# Patient Record
Sex: FEMALE | Race: OTHER | Employment: UNEMPLOYED | URBAN - METROPOLITAN AREA
[De-identification: names, ages, dates, MRNs, and addresses within clinical notes are randomized per-mention and may not be internally consistent; named-entity substitution may affect disease eponyms.]

---

## 2017-04-19 LAB
AMB EXT GLUCOSE 1HR OB: 135
AMB EXT HEMATOCRIT: 29
AMB EXT HEMOGLOBIN: 9.7
AMB EXT RPR: NON REACTIVE

## 2017-06-05 ENCOUNTER — OFFICE VISIT (OUTPATIENT)
Dept: OBGYN CLINIC | Facility: CLINIC | Age: 27
End: 2017-06-05

## 2017-06-05 VITALS
DIASTOLIC BLOOD PRESSURE: 54 MMHG | WEIGHT: 166 LBS | BODY MASS INDEX: 32.59 KG/M2 | HEIGHT: 60 IN | SYSTOLIC BLOOD PRESSURE: 120 MMHG

## 2017-06-05 DIAGNOSIS — Z34.83 PRENATAL CARE, SUBSEQUENT PREGNANCY, THIRD TRIMESTER: Primary | ICD-10-CM

## 2017-06-05 PROBLEM — K80.20 GALL BLADDER STONES: Status: ACTIVE | Noted: 2017-06-05

## 2017-06-05 PROCEDURE — 0500F INITIAL PRENATAL CARE VISIT: CPT | Performed by: OBSTETRICS & GYNECOLOGY

## 2017-06-05 PROCEDURE — 87081 CULTURE SCREEN ONLY: CPT | Performed by: OBSTETRICS & GYNECOLOGY

## 2017-06-05 RX ORDER — MULTIVITAMIN WITH IRON
TABLET ORAL
COMMUNITY

## 2017-06-05 NOTE — PROGRESS NOTES
Patient is transferring care was in [place]. No medicines other than prenatal vitamin she does not smoke no alcohol no drugs Jackie is vaginal delivery of a female. Has gallstones which she needs surgery.   Also history of anal intraepitheleal neoplasi

## 2017-06-12 ENCOUNTER — OFFICE VISIT (OUTPATIENT)
Dept: OBGYN CLINIC | Facility: CLINIC | Age: 27
End: 2017-06-12

## 2017-06-12 VITALS
HEIGHT: 60 IN | WEIGHT: 168 LBS | BODY MASS INDEX: 32.98 KG/M2 | SYSTOLIC BLOOD PRESSURE: 106 MMHG | DIASTOLIC BLOOD PRESSURE: 58 MMHG

## 2017-06-12 DIAGNOSIS — Z3A.39 39 WEEKS GESTATION OF PREGNANCY: ICD-10-CM

## 2017-06-12 DIAGNOSIS — Z34.83 PRENATAL CARE, SUBSEQUENT PREGNANCY, THIRD TRIMESTER: Primary | ICD-10-CM

## 2017-06-12 PROCEDURE — 0502F SUBSEQUENT PRENATAL CARE: CPT | Performed by: OBSTETRICS & GYNECOLOGY

## 2017-06-17 ENCOUNTER — HOSPITAL ENCOUNTER (OUTPATIENT)
Facility: HOSPITAL | Age: 27
Setting detail: OBSERVATION
Discharge: HOME | End: 2017-06-18
Attending: OBSTETRICS & GYNECOLOGY | Admitting: OBSTETRICS & GYNECOLOGY
Payer: MEDICAID

## 2017-06-17 ENCOUNTER — APPOINTMENT (OUTPATIENT)
Dept: ULTRASOUND IMAGING | Facility: HOSPITAL | Age: 27
End: 2017-06-17
Attending: OBSTETRICS & GYNECOLOGY
Payer: MEDICAID

## 2017-06-17 VITALS
TEMPERATURE: 98 F | WEIGHT: 164 LBS | BODY MASS INDEX: 32.2 KG/M2 | HEART RATE: 88 BPM | DIASTOLIC BLOOD PRESSURE: 63 MMHG | RESPIRATION RATE: 18 BRPM | SYSTOLIC BLOOD PRESSURE: 119 MMHG | HEIGHT: 60 IN

## 2017-06-17 PROBLEM — Z34.90 PREGNANCY (HCC): Status: ACTIVE | Noted: 2017-06-17

## 2017-06-17 PROBLEM — Z34.90 PREGNANCY: Status: ACTIVE | Noted: 2017-06-17

## 2017-06-17 PROCEDURE — 76819 FETAL BIOPHYS PROFIL W/O NST: CPT | Performed by: OBSTETRICS & GYNECOLOGY

## 2017-06-17 PROCEDURE — 76811 OB US DETAILED SNGL FETUS: CPT | Performed by: OBSTETRICS & GYNECOLOGY

## 2017-06-18 ENCOUNTER — HOSPITAL ENCOUNTER (INPATIENT)
Facility: HOSPITAL | Age: 27
LOS: 2 days | Discharge: HOME | End: 2017-06-20
Attending: OBSTETRICS & GYNECOLOGY | Admitting: OBSTETRICS & GYNECOLOGY
Payer: MEDICAID

## 2017-06-18 PROCEDURE — 59409 OBSTETRICAL CARE: CPT | Performed by: OBSTETRICS & GYNECOLOGY

## 2017-06-18 PROCEDURE — 0HQ9XZZ REPAIR PERINEUM SKIN, EXTERNAL APPROACH: ICD-10-PCS | Performed by: OBSTETRICS & GYNECOLOGY

## 2017-06-18 PROCEDURE — 59025 FETAL NON-STRESS TEST: CPT | Performed by: OBSTETRICS & GYNECOLOGY

## 2017-06-18 RX ORDER — OXYTOCIN 10 [USP'U]/ML
10 INJECTION, SOLUTION INTRAMUSCULAR; INTRAVENOUS ONCE
Status: COMPLETED | OUTPATIENT
Start: 2017-06-18 | End: 2017-06-18

## 2017-06-18 RX ORDER — CARBOPROST TROMETHAMINE 250 UG/ML
250 INJECTION, SOLUTION INTRAMUSCULAR ONCE
Status: COMPLETED | OUTPATIENT
Start: 2017-06-18 | End: 2017-06-18

## 2017-06-18 RX ORDER — METHYLERGONOVINE MALEATE 0.2 MG/ML
INJECTION INTRAVENOUS
Status: DISPENSED
Start: 2017-06-18 | End: 2017-06-18

## 2017-06-18 RX ORDER — BISACODYL 10 MG
10 SUPPOSITORY, RECTAL RECTAL ONCE AS NEEDED
Status: ACTIVE | OUTPATIENT
Start: 2017-06-18 | End: 2017-06-18

## 2017-06-18 RX ORDER — CARBOPROST TROMETHAMINE 250 UG/ML
INJECTION, SOLUTION INTRAMUSCULAR
Status: DISPENSED
Start: 2017-06-18 | End: 2017-06-18

## 2017-06-18 RX ORDER — MISOPROSTOL 200 UG/1
TABLET ORAL
Status: DISPENSED
Start: 2017-06-18 | End: 2017-06-18

## 2017-06-18 RX ORDER — ONDANSETRON 2 MG/ML
4 INJECTION INTRAMUSCULAR; INTRAVENOUS EVERY 6 HOURS PRN
Status: DISCONTINUED | OUTPATIENT
Start: 2017-06-18 | End: 2017-06-18

## 2017-06-18 RX ORDER — TERBUTALINE SULFATE 1 MG/ML
0.25 INJECTION, SOLUTION SUBCUTANEOUS AS NEEDED
Status: DISCONTINUED | OUTPATIENT
Start: 2017-06-18 | End: 2017-06-18 | Stop reason: HOSPADM

## 2017-06-18 RX ORDER — METHYLERGONOVINE MALEATE 0.2 MG/ML
INJECTION INTRAVENOUS
Status: COMPLETED
Start: 2017-06-18 | End: 2017-06-18

## 2017-06-18 RX ORDER — IBUPROFEN 600 MG/1
600 TABLET ORAL EVERY 6 HOURS
Status: DISCONTINUED | OUTPATIENT
Start: 2017-06-18 | End: 2017-06-20

## 2017-06-18 RX ORDER — ACETAMINOPHEN 325 MG/1
650 TABLET ORAL EVERY 4 HOURS PRN
Status: DISCONTINUED | OUTPATIENT
Start: 2017-06-18 | End: 2017-06-20

## 2017-06-18 RX ORDER — EPHEDRINE SULFATE 50 MG/ML
5 INJECTION, SOLUTION INTRAVENOUS AS NEEDED
Status: DISCONTINUED | OUTPATIENT
Start: 2017-06-18 | End: 2017-06-20

## 2017-06-18 RX ORDER — NALBUPHINE HCL 10 MG/ML
2.5 AMPUL (ML) INJECTION
Status: DISCONTINUED | OUTPATIENT
Start: 2017-06-18 | End: 2017-06-20

## 2017-06-18 RX ORDER — HYDROCODONE BITARTRATE AND ACETAMINOPHEN 5; 325 MG/1; MG/1
2 TABLET ORAL EVERY 4 HOURS PRN
Status: DISCONTINUED | OUTPATIENT
Start: 2017-06-18 | End: 2017-06-20

## 2017-06-18 RX ORDER — DOCUSATE SODIUM 100 MG/1
100 CAPSULE, LIQUID FILLED ORAL
Status: DISCONTINUED | OUTPATIENT
Start: 2017-06-18 | End: 2017-06-20

## 2017-06-18 RX ORDER — IBUPROFEN 600 MG/1
600 TABLET ORAL ONCE AS NEEDED
Status: DISCONTINUED | OUTPATIENT
Start: 2017-06-18 | End: 2017-06-18

## 2017-06-18 RX ORDER — DEXTROSE, SODIUM CHLORIDE, SODIUM LACTATE, POTASSIUM CHLORIDE, AND CALCIUM CHLORIDE 5; .6; .31; .03; .02 G/100ML; G/100ML; G/100ML; G/100ML; G/100ML
INJECTION, SOLUTION INTRAVENOUS AS NEEDED
Status: DISCONTINUED | OUTPATIENT
Start: 2017-06-18 | End: 2017-06-18 | Stop reason: HOSPADM

## 2017-06-18 RX ORDER — ZOLPIDEM TARTRATE 5 MG/1
5 TABLET ORAL NIGHTLY PRN
Status: DISCONTINUED | OUTPATIENT
Start: 2017-06-18 | End: 2017-06-20

## 2017-06-18 RX ORDER — SODIUM CHLORIDE, SODIUM LACTATE, POTASSIUM CHLORIDE, CALCIUM CHLORIDE 600; 310; 30; 20 MG/100ML; MG/100ML; MG/100ML; MG/100ML
INJECTION, SOLUTION INTRAVENOUS CONTINUOUS
Status: DISCONTINUED | OUTPATIENT
Start: 2017-06-18 | End: 2017-06-18 | Stop reason: HOSPADM

## 2017-06-18 RX ORDER — SIMETHICONE 80 MG
80 TABLET,CHEWABLE ORAL 3 TIMES DAILY PRN
Status: DISCONTINUED | OUTPATIENT
Start: 2017-06-18 | End: 2017-06-20

## 2017-06-18 RX ORDER — OXYTOCIN 10 [USP'U]/ML
INJECTION, SOLUTION INTRAMUSCULAR; INTRAVENOUS
Status: DISPENSED
Start: 2017-06-18 | End: 2017-06-18

## 2017-06-18 RX ORDER — HYDROCODONE BITARTRATE AND ACETAMINOPHEN 5; 325 MG/1; MG/1
1 TABLET ORAL EVERY 4 HOURS PRN
Status: DISCONTINUED | OUTPATIENT
Start: 2017-06-18 | End: 2017-06-20

## 2017-06-18 RX ORDER — METHYLERGONOVINE MALEATE 0.2 MG/ML
0.2 INJECTION INTRAVENOUS ONCE
Status: COMPLETED | OUTPATIENT
Start: 2017-06-18 | End: 2017-06-18

## 2017-06-18 NOTE — PROGRESS NOTES
NURSING ADMISSION NOTE      Patient admitted via wheelchair. Oriented to room. Safety precautions initiated. Bed in low position. Call light in reach. Teaching initiated, care plan discussed.

## 2017-06-18 NOTE — PROGRESS NOTES
Patient and  oriented to labor room. Plan of care discussed and questions answered. Consents explained and signed.

## 2017-06-18 NOTE — H&P
[address] Patient Status:  Inpatient    8/10/1990 MRN [id number]   [facility] 1NW-A Attending Arlene Chavez MD   Hosp Day # 0 PCP None Pcp     Subjective:   contractions 3-5 mins 40  weks

## 2017-06-18 NOTE — NST
Nonstress Test   Patient: Marky Cooler    Gestation: 40w3d    NST:       Variability: Moderate           Accelerations: Yes           Decelerations: None            Baseline: 140 BPM           Uterine Irritability: No           Contractions: Irregular

## 2017-06-18 NOTE — PROGRESS NOTES
admitted to triage 5 c/o onset of labor at 0300. EDC 6/15/17 (40 & 3/7 weeks gestation). GBS negative. Denies complications with pregnancy. Denies pertinent medical history. Rates pain 10/10. Declines labor pain relief.   Plan of care discussed a

## 2017-06-18 NOTE — L&D DELIVERY NOTE
Vaginal Delivery Note          Estrellita Kayser Patient Status:  Inpatient    8/10/1990 MRN [id number]   [facility] 1NW-A Attending Maeve Fair MD   Hosp Day # 0 PCP None Pcp

## 2017-06-18 NOTE — PROGRESS NOTES
Discharge instructions given and explained, pt verbalizes understanding and agrees. Pt ambulatory and discharged home accompanied by .

## 2017-06-18 NOTE — PROGRESS NOTES
Pt presents as  w/ edc of 6/15/17 c/o decreased fetal movement all day and irregular ctx. Pt ambulatory and admitted to triage room 4 accompanied by . EFM tested and applied, FHTs tracing and audible in 170s.  Visible fetal movement noted when pl

## 2017-06-18 NOTE — PROGRESS NOTES
Report given to mother baby RN. Pt and infant transferred to mother baby in stable condition accompanied by RN and .

## 2017-06-19 PROCEDURE — 30233N1 TRANSFUSION OF NONAUTOLOGOUS RED BLOOD CELLS INTO PERIPHERAL VEIN, PERCUTANEOUS APPROACH: ICD-10-PCS | Performed by: OBSTETRICS & GYNECOLOGY

## 2017-06-19 RX ORDER — MELATONIN
325
Status: DISCONTINUED | OUTPATIENT
Start: 2017-06-19 | End: 2017-06-20

## 2017-06-19 RX ORDER — SODIUM CHLORIDE 9 MG/ML
INJECTION, SOLUTION INTRAVENOUS ONCE
Status: COMPLETED | OUTPATIENT
Start: 2017-06-19 | End: 2017-06-19

## 2017-06-19 NOTE — PROGRESS NOTES
PPD#1    Pt has no complaints, lochia min   06/18/17 2026   BP: 110/56   Pulse: 80   Temp: 98.4 °F (36.9 °C)   Resp: 18     Recent Labs   Lab  06/18/17 0619 06/19/17 0611   RBC  4.03  2.40*   HGB  10.8*  6.4*   HCT  32.8*  20.4*   MCV  81.4  85.0   M

## 2017-06-19 NOTE — PROGRESS NOTES
Received call back from Dr Romulo Salinas, re: critical hgb. Order noted and received for 2 units PRBCs and PO iron TID.

## 2017-06-19 NOTE — PROGRESS NOTES
06/19/17 0705   Provider Notification   Reason for Communication Critical value  (Hgb 6.4, down from 10.8)   Test/Procedure Name Hgb   Provider Name Dr Porter Reach of Communication Page  (Message left through answering service)   Response Waiting for

## 2017-06-19 NOTE — PROGRESS NOTES
Started first bag PRBC blood transfusion @ 0916. Blood warmer applied. Blood transfusion complete @ 1010. 356 gm PRBC transfused. No adverse reaction. 2nd bag PRBC started @ 8287 and completed 1353. 358 g PRBC transfused. Vital signs done as order.  No adve

## 2017-06-19 NOTE — CM/SW NOTE
CM met with patient and reviewed insurance and PCP for infant. Patient stated that Adi Jackson had just called and they will add infant to [facility] plan for patient. Patient also confirmed that Dr Natividad Loza will be the PCP for patient.  CM asked if dewey

## 2017-06-20 VITALS
SYSTOLIC BLOOD PRESSURE: 101 MMHG | HEIGHT: 60 IN | TEMPERATURE: 98 F | DIASTOLIC BLOOD PRESSURE: 53 MMHG | HEART RATE: 61 BPM | BODY MASS INDEX: 32.2 KG/M2 | WEIGHT: 164 LBS | RESPIRATION RATE: 18 BRPM

## 2017-06-20 PROCEDURE — 99253 IP/OBS CNSLTJ NEW/EST LOW 45: CPT | Performed by: INTERNAL MEDICINE

## 2017-06-20 RX ORDER — MELATONIN
325
Status: DISCONTINUED | OUTPATIENT
Start: 2017-06-20 | End: 2017-06-20

## 2017-06-20 NOTE — CONSULTS
[facility]  Report of Consultation    Krysta Wil Patient Status:  Inpatient    8/10/1990 MRN [id number]   [facility] 1SW-J Attending Daxa Resendez MD   Hosp Day # 2 PCP None Pcp     Reason for Consultation:    Postpartum anemia. systems was completed. Pertinent positives and negatives noted in the the HPI. Physical Exam:  Vital signs: Blood pressure 101/53, pulse 61, temperature 98.1 °F (36.7 °C), temperature source Oral, resp.  rate 18, height 1.524 m (5'), weight 74.39 kg (16

## 2017-06-20 NOTE — PROGRESS NOTES
Dr. Rucker Resides here and discharged patient. Before discharge Hematology consult ordered. Office called and message left. Amee Morrissey

## 2017-06-20 NOTE — PLAN OF CARE
POSTPARTUM    • Long Term Goal:Experiences normal postpartum course Completed    • Optimize infant feeding at the breast Completed    • Establishment of adequate milk supply with medication/procedure interruptions Completed

## 2017-06-20 NOTE — PROGRESS NOTES
Patients labs were  drawn and Dr. Elle Aguirre was hr to speak with  patient. Patient ok to go home and will follow up with OP lab in 1 week. Dr Michelle Dhillon  informd of results and aware of 1 week lab draws.

## 2017-06-20 NOTE — PROGRESS NOTES
[facility]  Post-Partum Progress Note    Jesse Gtz Patient Status:  Inpatient    8/10/1990 MRN [id number]   [facility] 1SW-J Attending Servando Jeffers MD   Hosp Day # 2 PCP None Pcp     SUBJECTIVE:    Postpartum Day 1:    The pat

## 2017-06-20 NOTE — PROGRESS NOTES
[facility]  Post-Partum Progress Note    Kimi Semaj Patient Status:  Inpatient    8/10/1990 MRN [id number]   [facility] 1SW-J Attending Keysha Murillo MD   Hosp Day # 2 PCP None Pcp     SUBJECTIVE:    Postpartum Day 1:    The pat

## 2017-06-20 NOTE — PROGRESS NOTES
Patient discharged home in apparent good condition, with baby in carseat. Discharge instructions completed. Will have lab draw in one wek out patient. ..

## 2017-06-20 NOTE — PLAN OF CARE
POSTPARTUM    • Long Term Goal:Experiences normal postpartum course Progressing    • Optimize infant feeding at the breast Progressing    • Establishment of adequate milk supply with medication/procedure interruptions Progressing

## 2017-06-24 ENCOUNTER — TELEPHONE (OUTPATIENT)
Dept: OBGYN UNIT | Facility: HOSPITAL | Age: 27
End: 2017-06-24

## 2017-06-27 ENCOUNTER — TELEPHONE (OUTPATIENT)
Dept: HEMATOLOGY/ONCOLOGY | Facility: HOSPITAL | Age: 27
End: 2017-06-27

## 2017-06-27 NOTE — TELEPHONE ENCOUNTER
Message   Received:  Today   Message Contents   Cheri Arambula MD  P [facility] R[facility]             Please call, needs repeat labs this week as advised prior to d/c      Left VM for patient with instructions and number to call and schedule lab appointmen

## 2017-07-01 ENCOUNTER — LAB ENCOUNTER (OUTPATIENT)
Dept: LAB | Facility: HOSPITAL | Age: 27
End: 2017-07-01
Attending: OBSTETRICS & GYNECOLOGY
Payer: MEDICAID

## 2017-07-01 DIAGNOSIS — R58 BLEEDING: ICD-10-CM

## 2017-07-01 LAB
APTT PPP: 32.3 SECONDS (ref 25–34)
BASOPHILS # BLD AUTO: 0.06 X10(3) UL (ref 0–0.1)
BASOPHILS NFR BLD AUTO: 0.6 %
DEPRECATED HBV CORE AB SER IA-ACNC: 16.8 NG/ML (ref 10–291)
EOSINOPHIL # BLD AUTO: 0.15 X10(3) UL (ref 0–0.3)
EOSINOPHIL NFR BLD AUTO: 1.4 %
ERYTHROCYTE [DISTWIDTH] IN BLOOD BY AUTOMATED COUNT: 14.4 % (ref 11.5–16)
HCT VFR BLD AUTO: 36.1 % (ref 34–50)
HGB BLD-MCNC: 11.4 G/DL (ref 12–16)
IMMATURE GRANULOCYTE COUNT: 0.06 X10(3) UL (ref 0–1)
IMMATURE GRANULOCYTE RATIO %: 0.6 %
INR BLD: 1.01 (ref 0.89–1.11)
IRON SATURATION: 7 % (ref 13–45)
IRON: 27 UG/DL (ref 28–170)
LYMPHOCYTES # BLD AUTO: 2.01 X10(3) UL (ref 0.9–4)
LYMPHOCYTES NFR BLD AUTO: 19 %
MCH RBC QN AUTO: 27 PG (ref 27–33.2)
MCHC RBC AUTO-ENTMCNC: 31.6 G/DL (ref 31–37)
MCV RBC AUTO: 85.5 FL (ref 81–100)
MONOCYTES # BLD AUTO: 0.4 X10(3) UL (ref 0.1–0.6)
MONOCYTES NFR BLD AUTO: 3.8 %
NEUTROPHIL ABS PRELIM: 7.89 X10 (3) UL (ref 1.3–6.7)
NEUTROPHILS # BLD AUTO: 7.89 X10(3) UL (ref 1.3–6.7)
NEUTROPHILS NFR BLD AUTO: 74.6 %
PLATELET # BLD AUTO: 406 10(3)UL (ref 150–450)
PSA SERPL DL<=0.01 NG/ML-MCNC: 13.3 SECONDS (ref 12–14.3)
RBC # BLD AUTO: 4.22 X10(6)UL (ref 3.8–5.1)
RED CELL DISTRIBUTION WIDTH-SD: 44.4 FL (ref 35.1–46.3)
TOTAL IRON BINDING CAPACITY: 401 UG/DL (ref 298–536)
TRANSFERRIN: 269 MG/DL (ref 200–360)
WBC # BLD AUTO: 10.6 X10(3) UL (ref 4–13)

## 2017-07-01 PROCEDURE — 85025 COMPLETE CBC W/AUTO DIFF WBC: CPT

## 2017-07-01 PROCEDURE — 85610 PROTHROMBIN TIME: CPT

## 2017-07-01 PROCEDURE — 83540 ASSAY OF IRON: CPT

## 2017-07-01 PROCEDURE — 85730 THROMBOPLASTIN TIME PARTIAL: CPT

## 2017-07-01 PROCEDURE — 36415 COLL VENOUS BLD VENIPUNCTURE: CPT

## 2017-07-01 PROCEDURE — 82728 ASSAY OF FERRITIN: CPT

## 2017-07-01 PROCEDURE — 83550 IRON BINDING TEST: CPT

## 2017-07-05 ENCOUNTER — TELEPHONE (OUTPATIENT)
Dept: HEMATOLOGY/ONCOLOGY | Facility: HOSPITAL | Age: 27
End: 2017-07-05

## 2017-07-05 NOTE — TELEPHONE ENCOUNTER
Tammy Lindsey MD  P [facility] Rns             Call, Hb much better but Fe still low. Is she taking oral iron? Should continue till she is breastfeeding or for 6 months from now, whichever is longer      Spoke to patient, verbalized understanding.

## 2017-07-07 ENCOUNTER — MED REC SCAN ONLY (OUTPATIENT)
Dept: OBGYN CLINIC | Facility: CLINIC | Age: 27
End: 2017-07-07

## 2017-08-09 ENCOUNTER — OFFICE VISIT (OUTPATIENT)
Dept: OBGYN CLINIC | Facility: CLINIC | Age: 27
End: 2017-08-09

## 2017-08-09 VITALS
DIASTOLIC BLOOD PRESSURE: 60 MMHG | HEIGHT: 60 IN | HEART RATE: 88 BPM | WEIGHT: 141 LBS | BODY MASS INDEX: 27.68 KG/M2 | SYSTOLIC BLOOD PRESSURE: 118 MMHG

## 2017-08-09 NOTE — PROGRESS NOTES
HPI    Joseph Escoto is a 27year old female  here for 6 week post-partum visit. Patient delivered a  female infant on 17 via . Patient desires nexplanon for contraception. Patient is breast feeding.    Patient denies symptoms of depre no abnormal discharge  Cervix:  Normal without tenderness on motion  Uterus: normal in size, contour, position, mobility, without tenderness  Adnexa: normal without masses or tenderness  Perineum: well healed perineum  Anus: no hemorroids       ASSESSMENT/

## 2017-08-15 ENCOUNTER — OFFICE VISIT (OUTPATIENT)
Dept: OBGYN CLINIC | Facility: CLINIC | Age: 27
End: 2017-08-15

## 2017-08-15 VITALS
DIASTOLIC BLOOD PRESSURE: 70 MMHG | HEIGHT: 60 IN | SYSTOLIC BLOOD PRESSURE: 124 MMHG | TEMPERATURE: 99 F | HEART RATE: 88 BPM | RESPIRATION RATE: 18 BRPM | WEIGHT: 142 LBS | BODY MASS INDEX: 27.88 KG/M2

## 2017-08-15 DIAGNOSIS — Z30.017 INSERTION OF IMPLANTABLE SUBDERMAL CONTRACEPTIVE: ICD-10-CM

## 2017-08-15 DIAGNOSIS — Z30.017 NEXPLANON INSERTION: Primary | ICD-10-CM

## 2017-08-15 LAB
CONTROL LINE PRESENT WITH A CLEAR BACKGROUND (YES/NO): YES YES/NO
PREGNANCY TEST, URINE: NEGATIVE

## 2017-08-15 PROCEDURE — 11981 INSERTION DRUG DLVR IMPLANT: CPT | Performed by: OBSTETRICS & GYNECOLOGY

## 2017-08-15 PROCEDURE — 81025 URINE PREGNANCY TEST: CPT | Performed by: OBSTETRICS & GYNECOLOGY

## 2017-08-15 NOTE — PROGRESS NOTES
Nexplanon Insertion    Pregnancy Results: negative from urine test   Birth control method(s) used:    Consent was obtained from the patient. Insertion:  Lot Number   The patient was positioned with her left arm flexed.      2% lidocaine with epinephrine

## 2017-08-22 ENCOUNTER — MED REC SCAN ONLY (OUTPATIENT)
Dept: OBGYN CLINIC | Facility: CLINIC | Age: 27
End: 2017-08-22

## 2017-09-22 ENCOUNTER — TELEPHONE (OUTPATIENT)
Dept: OBGYN CLINIC | Facility: CLINIC | Age: 27
End: 2017-09-22

## 2019-12-20 LAB
AMB EXT HPV: NEGATIVE
AMB EXT THINPREP PAP: NEGATIVE

## 2021-07-12 ENCOUNTER — HOSPITAL ENCOUNTER (EMERGENCY)
Facility: HOSPITAL | Age: 31
Discharge: HOME | End: 2021-07-12
Attending: EMERGENCY MEDICINE
Payer: MEDICAID

## 2021-07-12 ENCOUNTER — APPOINTMENT (OUTPATIENT)
Dept: ULTRASOUND IMAGING | Facility: HOSPITAL | Age: 31
End: 2021-07-12
Attending: EMERGENCY MEDICINE
Payer: MEDICAID

## 2021-07-12 ENCOUNTER — APPOINTMENT (OUTPATIENT)
Dept: CT IMAGING | Facility: HOSPITAL | Age: 31
End: 2021-07-12
Attending: EMERGENCY MEDICINE
Payer: MEDICAID

## 2021-07-12 VITALS
SYSTOLIC BLOOD PRESSURE: 111 MMHG | TEMPERATURE: 98 F | HEART RATE: 71 BPM | OXYGEN SATURATION: 98 % | WEIGHT: 140 LBS | DIASTOLIC BLOOD PRESSURE: 67 MMHG | HEIGHT: 63 IN | BODY MASS INDEX: 24.8 KG/M2 | RESPIRATION RATE: 15 BRPM

## 2021-07-12 DIAGNOSIS — R10.31 RLQ ABDOMINAL PAIN: Primary | ICD-10-CM

## 2021-07-12 LAB
ALBUMIN SERPL-MCNC: 4 G/DL (ref 3.4–5)
ALBUMIN/GLOB SERPL: 1.1 {RATIO} (ref 1–2)
ALP LIVER SERPL-CCNC: 61 U/L
ALT SERPL-CCNC: 18 U/L
ANION GAP SERPL CALC-SCNC: 1 MMOL/L (ref 0–18)
AST SERPL-CCNC: 11 U/L (ref 15–37)
B-HCG SERPL-ACNC: <1 MIU/ML
BASOPHILS # BLD AUTO: 0.04 X10(3) UL (ref 0–0.2)
BASOPHILS NFR BLD AUTO: 0.7 %
BILIRUB SERPL-MCNC: 0.4 MG/DL (ref 0.1–2)
BUN BLD-MCNC: 8 MG/DL (ref 7–18)
BUN/CREAT SERPL: 12.9 (ref 10–20)
CALCIUM BLD-MCNC: 8.6 MG/DL (ref 8.5–10.1)
CHLORIDE SERPL-SCNC: 107 MMOL/L (ref 98–112)
CO2 SERPL-SCNC: 29 MMOL/L (ref 21–32)
CREAT BLD-MCNC: 0.62 MG/DL
DEPRECATED RDW RBC AUTO: 40.6 FL (ref 35.1–46.3)
EOSINOPHIL # BLD AUTO: 0.1 X10(3) UL (ref 0–0.7)
EOSINOPHIL NFR BLD AUTO: 1.8 %
ERYTHROCYTE [DISTWIDTH] IN BLOOD BY AUTOMATED COUNT: 12.7 % (ref 11–15)
GLOBULIN PLAS-MCNC: 3.7 G/DL (ref 2.8–4.4)
GLUCOSE BLD-MCNC: 85 MG/DL (ref 70–99)
HCT VFR BLD AUTO: 40.5 %
HGB BLD-MCNC: 13.4 G/DL
IMM GRANULOCYTES # BLD AUTO: 0.02 X10(3) UL (ref 0–1)
IMM GRANULOCYTES NFR BLD: 0.4 %
LIPASE SERPL-CCNC: 81 U/L (ref 73–393)
LYMPHOCYTES # BLD AUTO: 2.18 X10(3) UL (ref 1–4)
LYMPHOCYTES NFR BLD AUTO: 40.1 %
M PROTEIN MFR SERPL ELPH: 7.7 G/DL (ref 6.4–8.2)
MCH RBC QN AUTO: 28.9 PG (ref 26–34)
MCHC RBC AUTO-ENTMCNC: 33.1 G/DL (ref 31–37)
MCV RBC AUTO: 87.5 FL
MONOCYTES # BLD AUTO: 0.39 X10(3) UL (ref 0.1–1)
MONOCYTES NFR BLD AUTO: 7.2 %
NEUTROPHILS # BLD AUTO: 2.71 X10 (3) UL (ref 1.5–7.7)
NEUTROPHILS # BLD AUTO: 2.71 X10(3) UL (ref 1.5–7.7)
NEUTROPHILS NFR BLD AUTO: 49.8 %
OSMOLALITY SERPL CALC.SUM OF ELEC: 282 MOSM/KG (ref 275–295)
PLATELET # BLD AUTO: 294 10(3)UL (ref 150–450)
POTASSIUM SERPL-SCNC: 3.5 MMOL/L (ref 3.5–5.1)
RBC # BLD AUTO: 4.63 X10(6)UL
SODIUM SERPL-SCNC: 137 MMOL/L (ref 136–145)
WBC # BLD AUTO: 5.4 X10(3) UL (ref 4–11)

## 2021-07-12 PROCEDURE — 83690 ASSAY OF LIPASE: CPT | Performed by: EMERGENCY MEDICINE

## 2021-07-12 PROCEDURE — 76856 US EXAM PELVIC COMPLETE: CPT | Performed by: EMERGENCY MEDICINE

## 2021-07-12 PROCEDURE — 76830 TRANSVAGINAL US NON-OB: CPT | Performed by: EMERGENCY MEDICINE

## 2021-07-12 PROCEDURE — 85025 COMPLETE CBC W/AUTO DIFF WBC: CPT | Performed by: EMERGENCY MEDICINE

## 2021-07-12 PROCEDURE — 84702 CHORIONIC GONADOTROPIN TEST: CPT | Performed by: EMERGENCY MEDICINE

## 2021-07-12 PROCEDURE — 93975 VASCULAR STUDY: CPT | Performed by: EMERGENCY MEDICINE

## 2021-07-12 PROCEDURE — 96361 HYDRATE IV INFUSION ADD-ON: CPT

## 2021-07-12 PROCEDURE — 83690 ASSAY OF LIPASE: CPT

## 2021-07-12 PROCEDURE — 96374 THER/PROPH/DIAG INJ IV PUSH: CPT

## 2021-07-12 PROCEDURE — 80053 COMPREHEN METABOLIC PANEL: CPT | Performed by: EMERGENCY MEDICINE

## 2021-07-12 PROCEDURE — 99285 EMERGENCY DEPT VISIT HI MDM: CPT

## 2021-07-12 PROCEDURE — 74177 CT ABD & PELVIS W/CONTRAST: CPT | Performed by: EMERGENCY MEDICINE

## 2021-07-12 PROCEDURE — 80053 COMPREHEN METABOLIC PANEL: CPT

## 2021-07-12 PROCEDURE — 85025 COMPLETE CBC W/AUTO DIFF WBC: CPT

## 2021-07-12 PROCEDURE — 99284 EMERGENCY DEPT VISIT MOD MDM: CPT

## 2021-07-12 RX ORDER — KETOROLAC TROMETHAMINE 30 MG/ML
30 INJECTION, SOLUTION INTRAMUSCULAR; INTRAVENOUS ONCE
Status: COMPLETED | OUTPATIENT
Start: 2021-07-12 | End: 2021-07-12

## 2021-07-12 NOTE — ED PROVIDER NOTES
17-year-old female who presented with waxing and waning right lower quadrant pain for about 2 weeks. Some irregular spotting as well. Comfortable on exam.  Labs unremarkable. Imaging results as below.     CT APPENDIX ABD/PEL W CONTRAST (CPT=74177)    Res with the clinical symptoms to exclude acute cholecystitis. If further evaluation is needed, consider ultrasound. 3. Fat containing lesion within the right ovary measuring 3.0 x 2.8 x 3.2 cm likely represent a dermoid.  4. Prominent pelvic venous collateral contrast may be done if needed. Dictated by (CST): Pam Abdi MD on 7/12/2021 at 4:34 PM     Finalized by (CST): Pam Adbi MD on 7/12/2021 at 4:37 PM         Etiology of her pain is unclear at this point.   No evidence of torsion or ovar

## 2021-07-12 NOTE — ED INITIAL ASSESSMENT (HPI)
Pt reports to the ER with abdominal pain to the abdomen radiating to her back. Pt reports that the pain originated two weeks prior. Pt reports that she began to experience bleeding starting yesterday morning. Bleeding is described as light.

## 2021-07-12 NOTE — ED PROVIDER NOTES
Patient Seen in: [facility] Emergency Department      History   Patient presents with:  Abdomen/Flank Pain  Eval-G    Stated Complaint: RLQ pain, vaginal bleeding    HPI/Subjective:   HPI    Patient is a 27-year-old previously healthy female present signs reviewed. All other systems reviewed and negative except as noted above.     Physical Exam     ED Triage Vitals   BP 07/12/21 1219 126/73   Pulse 07/12/21 1219 63   Resp 07/12/21 1219 18   Temp 07/12/21 1309 97.7 °F (36.5 °C)   Temp src --    SpO TEST) - Normal   CBC WITH DIFFERENTIAL WITH PLATELET    Narrative: The following orders were created for panel order CBC With Differential With Platelet.   Procedure                               Abnormality         Status                     ---------

## 2021-09-02 ENCOUNTER — HOSPITAL ENCOUNTER (EMERGENCY)
Facility: HOSPITAL | Age: 31
Discharge: HOME | End: 2021-09-02
Attending: EMERGENCY MEDICINE
Payer: MEDICAID

## 2021-09-02 VITALS
RESPIRATION RATE: 16 BRPM | BODY MASS INDEX: 27.48 KG/M2 | HEIGHT: 60 IN | HEART RATE: 102 BPM | WEIGHT: 140 LBS | SYSTOLIC BLOOD PRESSURE: 109 MMHG | OXYGEN SATURATION: 96 % | DIASTOLIC BLOOD PRESSURE: 64 MMHG | TEMPERATURE: 100 F

## 2021-09-02 DIAGNOSIS — K04.7 DENTAL INFECTION: Primary | ICD-10-CM

## 2021-09-02 DIAGNOSIS — E86.0 DEHYDRATION: ICD-10-CM

## 2021-09-02 DIAGNOSIS — R51.9 ACUTE NONINTRACTABLE HEADACHE, UNSPECIFIED HEADACHE TYPE: ICD-10-CM

## 2021-09-02 LAB
ALBUMIN SERPL-MCNC: 3.5 G/DL (ref 3.4–5)
ALBUMIN/GLOB SERPL: 0.8 {RATIO} (ref 1–2)
ALP LIVER SERPL-CCNC: 71 U/L
ALT SERPL-CCNC: 21 U/L
ANION GAP SERPL CALC-SCNC: 3 MMOL/L (ref 0–18)
AST SERPL-CCNC: 18 U/L (ref 15–37)
BASOPHILS # BLD AUTO: 0.04 X10(3) UL (ref 0–0.2)
BASOPHILS NFR BLD AUTO: 0.3 %
BILIRUB SERPL-MCNC: 0.6 MG/DL (ref 0.1–2)
BILIRUB UR QL STRIP.AUTO: NEGATIVE
BUN BLD-MCNC: 8 MG/DL (ref 7–18)
CALCIUM BLD-MCNC: 8.7 MG/DL (ref 8.5–10.1)
CHLORIDE SERPL-SCNC: 104 MMOL/L (ref 98–112)
CO2 SERPL-SCNC: 27 MMOL/L (ref 21–32)
COLOR UR AUTO: YELLOW
CREAT BLD-MCNC: 0.73 MG/DL
EOSINOPHIL # BLD AUTO: 0 X10(3) UL (ref 0–0.7)
EOSINOPHIL NFR BLD AUTO: 0 %
ERYTHROCYTE [DISTWIDTH] IN BLOOD BY AUTOMATED COUNT: 12.2 %
GLOBULIN PLAS-MCNC: 4.6 G/DL (ref 2.8–4.4)
GLUCOSE BLD-MCNC: 138 MG/DL (ref 70–99)
GLUCOSE UR STRIP.AUTO-MCNC: NEGATIVE MG/DL
HCT VFR BLD AUTO: 36.9 %
HGB BLD-MCNC: 12.2 G/DL
IMM GRANULOCYTES # BLD AUTO: 0.07 X10(3) UL (ref 0–1)
IMM GRANULOCYTES NFR BLD: 0.4 %
KETONES UR STRIP.AUTO-MCNC: NEGATIVE MG/DL
LYMPHOCYTES # BLD AUTO: 0.89 X10(3) UL (ref 1–4)
LYMPHOCYTES NFR BLD AUTO: 5.6 %
M PROTEIN MFR SERPL ELPH: 8.1 G/DL (ref 6.4–8.2)
MCH RBC QN AUTO: 28.6 PG (ref 26–34)
MCHC RBC AUTO-ENTMCNC: 33.1 G/DL (ref 31–37)
MCV RBC AUTO: 86.6 FL
MONOCYTES # BLD AUTO: 0.79 X10(3) UL (ref 0.1–1)
MONOCYTES NFR BLD AUTO: 5 %
NEUTROPHILS # BLD AUTO: 14.15 X10 (3) UL (ref 1.5–7.7)
NEUTROPHILS # BLD AUTO: 14.15 X10(3) UL (ref 1.5–7.7)
NEUTROPHILS NFR BLD AUTO: 88.7 %
NITRITE UR QL STRIP.AUTO: NEGATIVE
OSMOLALITY SERPL CALC.SUM OF ELEC: 279 MOSM/KG (ref 275–295)
PH UR STRIP.AUTO: 6 [PH] (ref 5–8)
PLATELET # BLD AUTO: 243 10(3)UL (ref 150–450)
POTASSIUM SERPL-SCNC: 3.4 MMOL/L (ref 3.5–5.1)
PROT UR STRIP.AUTO-MCNC: 30 MG/DL
RBC # BLD AUTO: 4.26 X10(6)UL
SARS-COV-2 RNA RESP QL NAA+PROBE: NOT DETECTED
SODIUM SERPL-SCNC: 134 MMOL/L (ref 136–145)
SP GR UR STRIP.AUTO: 1.01 (ref 1–1.03)
UROBILINOGEN UR STRIP.AUTO-MCNC: <2 MG/DL
WBC # BLD AUTO: 15.9 X10(3) UL (ref 4–11)

## 2021-09-02 PROCEDURE — 87186 SC STD MICRODIL/AGAR DIL: CPT | Performed by: EMERGENCY MEDICINE

## 2021-09-02 PROCEDURE — 96365 THER/PROPH/DIAG IV INF INIT: CPT

## 2021-09-02 PROCEDURE — 87077 CULTURE AEROBIC IDENTIFY: CPT | Performed by: EMERGENCY MEDICINE

## 2021-09-02 PROCEDURE — 87086 URINE CULTURE/COLONY COUNT: CPT | Performed by: EMERGENCY MEDICINE

## 2021-09-02 PROCEDURE — 99284 EMERGENCY DEPT VISIT MOD MDM: CPT

## 2021-09-02 PROCEDURE — 96375 TX/PRO/DX INJ NEW DRUG ADDON: CPT

## 2021-09-02 PROCEDURE — 85025 COMPLETE CBC W/AUTO DIFF WBC: CPT | Performed by: EMERGENCY MEDICINE

## 2021-09-02 PROCEDURE — 96361 HYDRATE IV INFUSION ADD-ON: CPT

## 2021-09-02 PROCEDURE — 80053 COMPREHEN METABOLIC PANEL: CPT | Performed by: EMERGENCY MEDICINE

## 2021-09-02 PROCEDURE — 81001 URINALYSIS AUTO W/SCOPE: CPT | Performed by: EMERGENCY MEDICINE

## 2021-09-02 RX ORDER — KETOROLAC TROMETHAMINE 30 MG/ML
15 INJECTION, SOLUTION INTRAMUSCULAR; INTRAVENOUS ONCE
Status: COMPLETED | OUTPATIENT
Start: 2021-09-02 | End: 2021-09-02

## 2021-09-02 RX ORDER — METOCLOPRAMIDE HYDROCHLORIDE 5 MG/ML
10 INJECTION INTRAMUSCULAR; INTRAVENOUS ONCE
Status: COMPLETED | OUTPATIENT
Start: 2021-09-02 | End: 2021-09-02

## 2021-09-02 RX ORDER — ACETAMINOPHEN AND CODEINE PHOSPHATE 300; 30 MG/1; MG/1
1-2 TABLET ORAL EVERY 6 HOURS PRN
Qty: 10 TABLET | Refills: 0 | Status: SHIPPED | OUTPATIENT
Start: 2021-09-02 | End: 2021-09-09

## 2021-09-02 RX ORDER — AMOXICILLIN AND CLAVULANATE POTASSIUM 875; 125 MG/1; MG/1
1 TABLET, FILM COATED ORAL 2 TIMES DAILY
Qty: 20 TABLET | Refills: 0 | Status: SHIPPED | OUTPATIENT
Start: 2021-09-02 | End: 2021-09-12

## 2021-09-02 RX ORDER — DIPHENHYDRAMINE HYDROCHLORIDE 50 MG/ML
50 INJECTION INTRAMUSCULAR; INTRAVENOUS ONCE
Status: COMPLETED | OUTPATIENT
Start: 2021-09-02 | End: 2021-09-02

## 2021-09-02 NOTE — ED PROVIDER NOTES
Patient Seen in: [facility] Emergency Department      History   Patient presents with:  Headache  Dental Problem    Stated Complaint: headache after pt went to the dentist, two cavities filled nd cleaning, pain si*    HPI/Subjective:   HPI    29-yea (Oral)   Resp 16   Ht 152.4 cm (5')   Wt 63.5 kg   LMP 08/10/2021   SpO2 96%   BMI 27.34 kg/m²         Physical Exam  Vitals and nursing note reviewed. Chaperone present: Sister in room. Constitutional:       General: She is in acute distress.       Appea (*)     Sodium 134 (*)     Potassium 3.4 (*)     Globulin  4.6 (*)     A/G Ratio 0.8 (*)     All other components within normal limits   URINALYSIS WITH CULTURE REFLEX - Abnormal; Notable for the following components:    Clarity Urine Hazy (*)     Blood Ur giving her her fever.     Patient was feeling much better and was discharged in good condition                             Disposition and Plan     Clinical Impression:  Dental infection  (primary encounter diagnosis)  Acute nonintractable headache, unspeci

## 2021-09-02 NOTE — ED INITIAL ASSESSMENT (HPI)
Pt to ED with c/o headache x3 days, mid upper abdominal pain began at 3am, states had dental cleaning on Monday also having jaw pain, nausea and fevers x 2 days.

## 2023-02-23 LAB
AMB EXT ANTIBODY SCREEN: NEGATIVE
AMB EXT GLUCOSE 1HR OB: 126
AMB EXT HBSAG SCREEN: NEGATIVE
AMB EXT HEMATOCRIT: 35.2
AMB EXT HEMOGLOBIN: 12.1
AMB EXT HEPATITIS C VIRUS ANTIBODY: NEGATIVE
AMB EXT HIV AG AB COMBO: NEGATIVE
AMB EXT MCV: 87.2
AMB EXT PLATELETS: 296
AMB EXT RPR: NON REACTIVE

## 2023-04-20 LAB
AMB EXT GLUCOSE 1HR OB: 145
AMB EXT HEMATOCRIT: 33.9
AMB EXT HEMOGLOBIN: 11.6

## 2023-05-16 ENCOUNTER — HOSPITAL ENCOUNTER (OUTPATIENT)
Facility: HOSPITAL | Age: 33
Discharge: HOME | End: 2023-05-16
Attending: OBSTETRICS & GYNECOLOGY | Admitting: OBSTETRICS & GYNECOLOGY
Payer: MEDICAID

## 2023-05-16 VITALS — TEMPERATURE: 98 F | DIASTOLIC BLOOD PRESSURE: 60 MMHG | SYSTOLIC BLOOD PRESSURE: 129 MMHG | HEART RATE: 102 BPM

## 2023-05-16 LAB
ALBUMIN SERPL-MCNC: 2.8 G/DL (ref 3.4–5)
ALBUMIN/GLOB SERPL: 0.7 {RATIO} (ref 1–2)
ALP LIVER SERPL-CCNC: 68 U/L
ALT SERPL-CCNC: 23 U/L
ANION GAP SERPL CALC-SCNC: 5 MMOL/L (ref 0–18)
AST SERPL-CCNC: 21 U/L (ref 15–37)
BASOPHILS # BLD AUTO: 0.02 X10(3) UL (ref 0–0.2)
BASOPHILS NFR BLD AUTO: 0.2 %
BILIRUB SERPL-MCNC: 0.3 MG/DL (ref 0.1–2)
BILIRUBIN URINE: NEGATIVE
BLOOD URINE: NEGATIVE
BUN BLD-MCNC: 6 MG/DL (ref 7–18)
CALCIUM BLD-MCNC: 8.5 MG/DL (ref 8.5–10.1)
CHLORIDE SERPL-SCNC: 110 MMOL/L (ref 98–112)
CO2 SERPL-SCNC: 22 MMOL/L (ref 21–32)
CONTROL RUN WITHIN 24 HOURS?: YES
CREAT BLD-MCNC: 0.3 MG/DL
EOSINOPHIL # BLD AUTO: 0.07 X10(3) UL (ref 0–0.7)
EOSINOPHIL NFR BLD AUTO: 0.8 %
ERYTHROCYTE [DISTWIDTH] IN BLOOD BY AUTOMATED COUNT: 12.5 %
FASTING STATUS PATIENT QL REPORTED: NO
GFR SERPLBLD BASED ON 1.73 SQ M-ARVRAT: 146 ML/MIN/1.73M2 (ref 60–?)
GLOBULIN PLAS-MCNC: 3.8 G/DL (ref 2.8–4.4)
GLUCOSE BLD-MCNC: 89 MG/DL (ref 70–99)
GLUCOSE URINE: NEGATIVE
HCT VFR BLD AUTO: 31.3 %
HGB BLD-MCNC: 10.4 G/DL
IMM GRANULOCYTES # BLD AUTO: 0.04 X10(3) UL (ref 0–1)
IMM GRANULOCYTES NFR BLD: 0.5 %
KETONE URINE: NEGATIVE
LYMPHOCYTES # BLD AUTO: 0.97 X10(3) UL (ref 1–4)
LYMPHOCYTES NFR BLD AUTO: 11.3 %
MCH RBC QN AUTO: 28.7 PG (ref 26–34)
MCHC RBC AUTO-ENTMCNC: 33.2 G/DL (ref 31–37)
MCV RBC AUTO: 86.5 FL
MONOCYTES # BLD AUTO: 0.31 X10(3) UL (ref 0.1–1)
MONOCYTES NFR BLD AUTO: 3.6 %
NEUTROPHILS # BLD AUTO: 7.19 X10 (3) UL (ref 1.5–7.7)
NEUTROPHILS # BLD AUTO: 7.19 X10(3) UL (ref 1.5–7.7)
NEUTROPHILS NFR BLD AUTO: 83.6 %
NITRITE URINE: NEGATIVE
OSMOLALITY SERPL CALC.SUM OF ELEC: 281 MOSM/KG (ref 275–295)
PH URINE: 7 (ref 5–8)
PLATELET # BLD AUTO: 234 10(3)UL (ref 150–450)
POTASSIUM SERPL-SCNC: 3.6 MMOL/L (ref 3.5–5.1)
PROT SERPL-MCNC: 6.6 G/DL (ref 6.4–8.2)
PROTEIN URINE: NEGATIVE
RBC # BLD AUTO: 3.62 X10(6)UL
SODIUM SERPL-SCNC: 137 MMOL/L (ref 136–145)
SPEC GRAVITY: 1.01 (ref 1–1.03)
URINE CLARITY: CLEAR
URINE COLOR: YELLOW
UROBILINOGEN URINE: 0.2
WBC # BLD AUTO: 8.6 X10(3) UL (ref 4–11)

## 2023-05-16 PROCEDURE — 99203 OFFICE O/P NEW LOW 30 MIN: CPT | Performed by: OBSTETRICS & GYNECOLOGY

## 2023-05-16 RX ORDER — LOPERAMIDE HCL 1 MG/7.5ML
2 SOLUTION ORAL ONCE
Status: COMPLETED | OUTPATIENT
Start: 2023-05-16 | End: 2023-05-16

## 2023-05-16 RX ORDER — ONDANSETRON 2 MG/ML
4 INJECTION INTRAMUSCULAR; INTRAVENOUS ONCE
Status: COMPLETED | OUTPATIENT
Start: 2023-05-16 | End: 2023-05-16

## 2023-05-16 NOTE — PROGRESS NOTES
Care relinquished to [facility] [employer] RN. Patient in stable condition. Still awaiting prenatal records from previous OB office.

## 2023-05-16 NOTE — NST
Nonstress Test   Patient: Rory Calvillo    Gestation: 32w0d    NST:       Variability: Moderate           Accelerations: Yes           Decelerations: None            Baseline: 135 BPM           Uterine Irritability: Yes           Contractions: Irregular                                        Acoustic Stimulator: No           Nonstress Test Interpretation: Reactive           Nonstress Test Second Interpretation: Reactive                     Additional Comments

## 2023-05-16 NOTE — PROGRESS NOTES
Pt is a 29year old female admitted to TRG3/TRG3-A. Patient presents with:   Assessment: Nausea and diarrhea since 0600 this am, patient thinking its food poisoning from meat she ate last night. +FM, denies LOF, cramping/contractions       Pt is  32w0d intra-uterine pregnancy. History obtained, consents signed. Oriented to room, staff, and plan of care.

## 2023-05-16 NOTE — DISCHARGE INSTRUCTIONS
Discharge Instructions    Diet: Rebekah Ely diet or BRAT diet, stay hydrated  Activity: Normal activity         General Instructions    Call your OB doctor if: Fluid leaking from your vagina;Uterine contractions 10 minutes or closer for 1 to 2 hours;Uterine contractions increasing in intensity and frequency; Decrease in fetal movement;Vaginal bleeding;Temperature greater than 100F;Vaginal or rectal pressure

## 2023-05-30 ENCOUNTER — APPOINTMENT (OUTPATIENT)
Dept: ULTRASOUND IMAGING | Facility: HOSPITAL | Age: 33
End: 2023-05-30
Attending: OBSTETRICS & GYNECOLOGY
Payer: MEDICAID

## 2023-05-30 ENCOUNTER — HOSPITAL ENCOUNTER (OUTPATIENT)
Facility: HOSPITAL | Age: 33
Discharge: HOME | End: 2023-05-31
Attending: OBSTETRICS & GYNECOLOGY | Admitting: OBSTETRICS & GYNECOLOGY
Payer: MEDICAID

## 2023-05-30 VITALS
HEART RATE: 82 BPM | TEMPERATURE: 97 F | SYSTOLIC BLOOD PRESSURE: 124 MMHG | RESPIRATION RATE: 18 BRPM | DIASTOLIC BLOOD PRESSURE: 59 MMHG

## 2023-05-30 PROCEDURE — 87653 STREP B DNA AMP PROBE: CPT | Performed by: OBSTETRICS & GYNECOLOGY

## 2023-05-30 PROCEDURE — 87081 CULTURE SCREEN ONLY: CPT | Performed by: OBSTETRICS & GYNECOLOGY

## 2023-05-30 PROCEDURE — 76818 FETAL BIOPHYS PROFILE W/NST: CPT | Performed by: OBSTETRICS & GYNECOLOGY

## 2023-05-30 PROCEDURE — 76819 FETAL BIOPHYS PROFIL W/O NST: CPT | Performed by: OBSTETRICS & GYNECOLOGY

## 2023-05-31 ENCOUNTER — TELEPHONE (OUTPATIENT)
Facility: CLINIC | Age: 33
End: 2023-05-31

## 2023-05-31 PROCEDURE — 99213 OFFICE O/P EST LOW 20 MIN: CPT

## 2023-05-31 PROCEDURE — 59025 FETAL NON-STRESS TEST: CPT

## 2023-05-31 NOTE — PROGRESS NOTES
Discharged to home per ambulatory in stable condition with written and verbal instructions. Kick counts reviewed. Patient verbalizes understanding of information given.

## 2023-05-31 NOTE — TELEPHONE ENCOUNTER
Pt filled out CAM to request pregnancy records from [facility]; faxed to [phone], conformation rec'd; CAM sent to scanning.

## 2023-05-31 NOTE — NST
Nonstress Test   Patient: Dex Mcdonald    Gestation: 34w1d    NST:       Variability: Moderate           Accelerations: Yes           Decelerations: None            Baseline: 125 BPM           Uterine Irritability: Yes           Contractions: Irregular                    Contraction Frequency: occas with irrit, pt not aware on ctx                   Acoustic Stimulator: No           Nonstress Test Interpretation: Reactive           Nonstress Test Second Interpretation: Reactive                     Additional Comments

## 2023-05-31 NOTE — PROGRESS NOTES
Pt is a 29year old female admitted to TR5/TRG5-A. Patient presents with:  Decreased Fetal Movement: Pt felt baby move about an hour ago but the movements have been less today, denies lof, ctx, bleeding     Pt is  34w0d intra-uterine pregnancy. History obtained, consents signed. Oriented to room, staff, and plan of care.

## 2023-05-31 NOTE — DISCHARGE INSTRUCTIONS
Labor Discharge Instructions    Call your OB doctor if you have any of the following signs:    Period-like cramps that may come and go  Low, dull backache  Pressure in your lower abdomen that may feel like the baby is pushing down  Change in the type or amount of vaginal discharge  Abdominal cramps that may be accompanied by diarrhea  Fluid leaking from your vagina (may or may not be bloody)

## 2023-06-01 LAB — GROUP B STREP BY PCR FOR PCR OVT: NEGATIVE

## 2023-06-07 ENCOUNTER — INITIAL PRENATAL (OUTPATIENT)
Facility: CLINIC | Age: 33
End: 2023-06-07
Payer: MEDICAID

## 2023-06-07 VITALS
DIASTOLIC BLOOD PRESSURE: 66 MMHG | BODY MASS INDEX: 33.77 KG/M2 | HEART RATE: 108 BPM | HEIGHT: 60 IN | WEIGHT: 172 LBS | SYSTOLIC BLOOD PRESSURE: 112 MMHG

## 2023-06-07 DIAGNOSIS — Z36.89 EVALUATE FETAL POSITION USING ULTRASOUND: ICD-10-CM

## 2023-06-07 DIAGNOSIS — Z11.4 ENCOUNTER FOR SCREENING FOR HIV: ICD-10-CM

## 2023-06-07 DIAGNOSIS — Z34.93 PRENATAL CARE, THIRD TRIMESTER: Primary | ICD-10-CM

## 2023-06-07 DIAGNOSIS — O99.810 ABNORMAL MATERNAL GLUCOSE TOLERANCE, ANTEPARTUM: ICD-10-CM

## 2023-06-07 LAB
BILIRUBIN: NEGATIVE
GLUCOSE (URINE DIPSTICK): NEGATIVE MG/DL
KETONES (URINE DIPSTICK): NEGATIVE MG/DL
MULTISTIX LOT#: ABNORMAL NUMERIC
NITRITE, URINE: NEGATIVE
OCCULT BLOOD: NEGATIVE
PH, URINE: 7 (ref 4.5–8)
PROTEIN (URINE DIPSTICK): NEGATIVE MG/DL
SPECIFIC GRAVITY: 1.02 (ref 1–1.03)
UROBILINOGEN,SEMI-QN: 0.2 MG/DL (ref 0–1.9)

## 2023-06-07 PROCEDURE — 3008F BODY MASS INDEX DOCD: CPT

## 2023-06-07 PROCEDURE — 87086 URINE CULTURE/COLONY COUNT: CPT

## 2023-06-07 PROCEDURE — 87591 N.GONORRHOEAE DNA AMP PROB: CPT

## 2023-06-07 PROCEDURE — 87491 CHLMYD TRACH DNA AMP PROBE: CPT

## 2023-06-07 PROCEDURE — 0500F INITIAL PRENATAL CARE VISIT: CPT

## 2023-06-07 PROCEDURE — 81002 URINALYSIS NONAUTO W/O SCOPE: CPT

## 2023-06-07 PROCEDURE — 3074F SYST BP LT 130 MM HG: CPT

## 2023-06-07 PROCEDURE — 3078F DIAST BP <80 MM HG: CPT

## 2023-06-07 RX ORDER — CHOLECALCIFEROL (VITAMIN D3) 25 MCG
1 TABLET,CHEWABLE ORAL DAILY
COMMUNITY

## 2023-06-07 NOTE — PROGRESS NOTES
New OB  35w5d     [facility] 2023 by LMP     She is a transfer OB from [place], per patient her last OB appointment was 23. She was seen on  in L&D for N&V and diarrhea, CBC , CMP ordered at that time. Was seen again in L&D  for decreased fetal movement, NST reactive and BPP  reviewed by Dr. Ian Villagomez. GBS cultured was obtained at that visit. She did not bring a copy of her prenatal records. She has a copy of her prenatal lab results on her My Chart on her phone. Advised patient to print out a copy and bring to the office. Her provider in [place] does not utilize Epic. OBhx: two term  deliveries,  and 2017, with both deliveries - had postpartum hemorrhage, per pt needed a blood transfusion \"a shot in my leg and a pill I had to swallow\" to stop the bleeding. PMHx: denies Hepatitis, VTE, HSV   PSHx: denies   FMHx: father had diabetes -      Per pt My chart result 1 hr  at 29 wks, Tdap given 2023   -3 hr GTT -& 3rd tri HIV order placed today  -breast pump, pre registration, and peds info given      Anemia   - on  Hb 10.4 - currently taking PNV tablet ( not gummy), advise to start extra iron 3x/weekly. Will recheck CBC 3-4 weeks     Hx Postpartum Hemorrhage  - methergine, Cytotec, Hemabate, Tranexamic acid  in the   delivery room   -possible Leonardo? Family history diabetes  -failed 1 hr GTT, 3 hr GTT - not done yet  -Growth US order placed today     Waiting for her prenatal labs, if PAP not up to date, will do postpartum, if does not provide a copy of her prenatal labs at next visit, will need to reorder.

## 2023-06-08 LAB
C TRACH DNA SPEC QL NAA+PROBE: NEGATIVE
N GONORRHOEA DNA SPEC QL NAA+PROBE: NEGATIVE

## 2023-06-14 ENCOUNTER — ROUTINE PRENATAL (OUTPATIENT)
Facility: CLINIC | Age: 33
End: 2023-06-14
Payer: MEDICAID

## 2023-06-14 ENCOUNTER — LAB ENCOUNTER (OUTPATIENT)
Dept: LAB | Facility: HOSPITAL | Age: 33
End: 2023-06-14
Payer: MEDICAID

## 2023-06-14 VITALS
SYSTOLIC BLOOD PRESSURE: 110 MMHG | HEART RATE: 108 BPM | HEIGHT: 60 IN | WEIGHT: 172 LBS | DIASTOLIC BLOOD PRESSURE: 54 MMHG | BODY MASS INDEX: 33.77 KG/M2

## 2023-06-14 DIAGNOSIS — Z3A.36 36 WEEKS GESTATION OF PREGNANCY: ICD-10-CM

## 2023-06-14 DIAGNOSIS — Z34.83 PRENATAL CARE, SUBSEQUENT PREGNANCY, THIRD TRIMESTER: Primary | ICD-10-CM

## 2023-06-14 DIAGNOSIS — O99.810 ABNORMAL MATERNAL GLUCOSE TOLERANCE, ANTEPARTUM: ICD-10-CM

## 2023-06-14 DIAGNOSIS — Z11.4 ENCOUNTER FOR SCREENING FOR HIV: ICD-10-CM

## 2023-06-14 LAB
GLUCOSE (URINE DIPSTICK): NEGATIVE MG/DL
GLUCOSE 1H P GLC SERPL-MCNC: 158 MG/DL
GLUCOSE 2H P GLC SERPL-MCNC: 96 MG/DL
GLUCOSE 3H P GLC SERPL-MCNC: 66 MG/DL (ref 70–140)
GLUCOSE P FAST SERPL-MCNC: 91 MG/DL
MULTISTIX LOT#: ABNORMAL NUMERIC
PROTEIN (URINE DIPSTICK): 30 MG/DL

## 2023-06-14 PROCEDURE — 3078F DIAST BP <80 MM HG: CPT | Performed by: OBSTETRICS & GYNECOLOGY

## 2023-06-14 PROCEDURE — 3074F SYST BP LT 130 MM HG: CPT | Performed by: OBSTETRICS & GYNECOLOGY

## 2023-06-14 PROCEDURE — 82952 GTT-ADDED SAMPLES: CPT

## 2023-06-14 PROCEDURE — 0502F SUBSEQUENT PRENATAL CARE: CPT | Performed by: OBSTETRICS & GYNECOLOGY

## 2023-06-14 PROCEDURE — 87389 HIV-1 AG W/HIV-1&-2 AB AG IA: CPT

## 2023-06-14 PROCEDURE — 81002 URINALYSIS NONAUTO W/O SCOPE: CPT | Performed by: OBSTETRICS & GYNECOLOGY

## 2023-06-14 PROCEDURE — 3008F BODY MASS INDEX DOCD: CPT | Performed by: OBSTETRICS & GYNECOLOGY

## 2023-06-14 PROCEDURE — 36415 COLL VENOUS BLD VENIPUNCTURE: CPT

## 2023-06-14 PROCEDURE — 82951 GLUCOSE TOLERANCE TEST (GTT): CPT

## 2023-06-14 NOTE — PROGRESS NOTES
2/8/2023  Ultrasound  Anatomy US  [facility] 7/11/2023  GA=18w1d  LJT=445 bpm  Vertex  BRITTNEY normal  3 vessel cord  Placenta posterior, Grade 2  EFW = 31%  CL=48mm  Right Ovary - possible dermoid noted measuring 66t12q24 mm (last 2/6/17 49d30d68 mm)  Fetal anatomy is wnl

## 2023-06-14 NOTE — PROGRESS NOTES
Patient has no complaints , awaiting for records from [place]   Per pt My chart result 1 hr  at 29 wks, Tdap given 4/11/2023   -3 hr GTT -& 3rd tri HIV pending  -breast pump, pre registration, and peds info given      Anemia   - on 5/16 Hb 10.4 - currently taking PNV tablet ( not gummy), advise to start extra iron 3x/weekly. Will recheck CBC 3-4 weeks     Hx Postpartum Hemorrhage  - methergine, Cytotec, Hemabate, Tranexamic acid  in the   delivery room   -possible Leonardo?        Family history diabetes  -failed 1 hr GTT, 3 hr GTT - done today  -Growth US ordered

## 2023-06-20 ENCOUNTER — HOSPITAL ENCOUNTER (OUTPATIENT)
Dept: ULTRASOUND IMAGING | Age: 33
Discharge: HOME | End: 2023-06-20
Payer: MEDICAID

## 2023-06-20 DIAGNOSIS — Z36.89 EVALUATE FETAL POSITION USING ULTRASOUND: ICD-10-CM

## 2023-06-20 DIAGNOSIS — O99.810 ABNORMAL MATERNAL GLUCOSE TOLERANCE, ANTEPARTUM: ICD-10-CM

## 2023-06-20 PROCEDURE — 76816 OB US FOLLOW-UP PER FETUS: CPT

## 2023-06-21 ENCOUNTER — ROUTINE PRENATAL (OUTPATIENT)
Facility: CLINIC | Age: 33
End: 2023-06-21
Payer: MEDICAID

## 2023-06-21 VITALS
DIASTOLIC BLOOD PRESSURE: 66 MMHG | HEART RATE: 98 BPM | HEIGHT: 60 IN | WEIGHT: 175.81 LBS | BODY MASS INDEX: 34.52 KG/M2 | SYSTOLIC BLOOD PRESSURE: 112 MMHG

## 2023-06-21 DIAGNOSIS — Z34.93 ENCOUNTER FOR SUPERVISION OF NORMAL PREGNANCY IN THIRD TRIMESTER, UNSPECIFIED GRAVIDITY: Primary | ICD-10-CM

## 2023-06-21 DIAGNOSIS — Z13.0 SCREENING FOR DEFICIENCY ANEMIA: ICD-10-CM

## 2023-06-21 LAB
GLUCOSE (URINE DIPSTICK): NEGATIVE MG/DL
MULTISTIX LOT#: NORMAL NUMERIC
PROTEIN (URINE DIPSTICK): NEGATIVE MG/DL

## 2023-06-21 PROCEDURE — 3008F BODY MASS INDEX DOCD: CPT

## 2023-06-21 PROCEDURE — 0502F SUBSEQUENT PRENATAL CARE: CPT

## 2023-06-21 PROCEDURE — 3074F SYST BP LT 130 MM HG: CPT

## 2023-06-21 PROCEDURE — 81002 URINALYSIS NONAUTO W/O SCOPE: CPT

## 2023-06-21 PROCEDURE — 3078F DIAST BP <80 MM HG: CPT

## 2023-06-29 ENCOUNTER — ROUTINE PRENATAL (OUTPATIENT)
Facility: CLINIC | Age: 33
End: 2023-06-29
Payer: MEDICAID

## 2023-06-29 VITALS
SYSTOLIC BLOOD PRESSURE: 122 MMHG | DIASTOLIC BLOOD PRESSURE: 62 MMHG | HEART RATE: 136 BPM | HEIGHT: 60 IN | BODY MASS INDEX: 34.55 KG/M2 | WEIGHT: 176 LBS

## 2023-06-29 DIAGNOSIS — Z3A.38 38 WEEKS GESTATION OF PREGNANCY: ICD-10-CM

## 2023-06-29 DIAGNOSIS — Z34.83 PRENATAL CARE, SUBSEQUENT PREGNANCY, THIRD TRIMESTER: Primary | ICD-10-CM

## 2023-06-29 LAB
GLUCOSE (URINE DIPSTICK): NEGATIVE MG/DL
MULTISTIX LOT#: ABNORMAL NUMERIC
PROTEIN (URINE DIPSTICK): 30 MG/DL

## 2023-06-29 PROCEDURE — 3008F BODY MASS INDEX DOCD: CPT | Performed by: OBSTETRICS & GYNECOLOGY

## 2023-06-29 PROCEDURE — 0502F SUBSEQUENT PRENATAL CARE: CPT | Performed by: OBSTETRICS & GYNECOLOGY

## 2023-06-29 PROCEDURE — 3078F DIAST BP <80 MM HG: CPT | Performed by: OBSTETRICS & GYNECOLOGY

## 2023-06-29 PROCEDURE — 3074F SYST BP LT 130 MM HG: CPT | Performed by: OBSTETRICS & GYNECOLOGY

## 2023-06-29 PROCEDURE — 81002 URINALYSIS NONAUTO W/O SCOPE: CPT | Performed by: OBSTETRICS & GYNECOLOGY

## 2023-06-30 ENCOUNTER — APPOINTMENT (OUTPATIENT)
Dept: OBGYN CLINIC | Facility: HOSPITAL | Age: 33
End: 2023-06-30
Payer: MEDICAID

## 2023-06-30 ENCOUNTER — ANESTHESIA (OUTPATIENT)
Dept: OBGYN UNIT | Facility: HOSPITAL | Age: 33
End: 2023-06-30
Payer: MEDICAID

## 2023-06-30 ENCOUNTER — ANESTHESIA EVENT (OUTPATIENT)
Dept: OBGYN UNIT | Facility: HOSPITAL | Age: 33
End: 2023-06-30
Payer: MEDICAID

## 2023-06-30 ENCOUNTER — HOSPITAL ENCOUNTER (INPATIENT)
Facility: HOSPITAL | Age: 33
LOS: 2 days | Discharge: HOME | End: 2023-07-02
Attending: OBSTETRICS & GYNECOLOGY | Admitting: OBSTETRICS & GYNECOLOGY
Payer: MEDICAID

## 2023-06-30 LAB
ANTIBODY SCREEN: NEGATIVE
BASOPHILS # BLD AUTO: 0.05 X10(3) UL (ref 0–0.2)
BASOPHILS NFR BLD AUTO: 0.5 %
EOSINOPHIL # BLD AUTO: 0.14 X10(3) UL (ref 0–0.7)
EOSINOPHIL NFR BLD AUTO: 1.5 %
ERYTHROCYTE [DISTWIDTH] IN BLOOD BY AUTOMATED COUNT: 13.6 %
HCT VFR BLD AUTO: 30.8 %
HGB BLD-MCNC: 10.2 G/DL
IMM GRANULOCYTES # BLD AUTO: 0.11 X10(3) UL (ref 0–1)
IMM GRANULOCYTES NFR BLD: 1.2 %
LYMPHOCYTES # BLD AUTO: 2.51 X10(3) UL (ref 1–4)
LYMPHOCYTES NFR BLD AUTO: 27.4 %
MCH RBC QN AUTO: 28.2 PG (ref 26–34)
MCHC RBC AUTO-ENTMCNC: 33.1 G/DL (ref 31–37)
MCV RBC AUTO: 85.1 FL
MONOCYTES # BLD AUTO: 0.61 X10(3) UL (ref 0.1–1)
MONOCYTES NFR BLD AUTO: 6.7 %
NEUTROPHILS # BLD AUTO: 5.73 X10 (3) UL (ref 1.5–7.7)
NEUTROPHILS # BLD AUTO: 5.73 X10(3) UL (ref 1.5–7.7)
NEUTROPHILS NFR BLD AUTO: 62.7 %
PLATELET # BLD AUTO: 286 10(3)UL (ref 150–450)
RBC # BLD AUTO: 3.62 X10(6)UL
RH BLOOD TYPE: POSITIVE
T PALLIDUM AB SER QL IA: NONREACTIVE
WBC # BLD AUTO: 9.2 X10(3) UL (ref 4–11)

## 2023-06-30 PROCEDURE — 0HQ9XZZ REPAIR PERINEUM SKIN, EXTERNAL APPROACH: ICD-10-PCS | Performed by: OBSTETRICS & GYNECOLOGY

## 2023-06-30 PROCEDURE — 10907ZC DRAINAGE OF AMNIOTIC FLUID, THERAPEUTIC FROM PRODUCTS OF CONCEPTION, VIA NATURAL OR ARTIFICIAL OPENING: ICD-10-PCS | Performed by: OBSTETRICS & GYNECOLOGY

## 2023-06-30 PROCEDURE — 3E0P7VZ INTRODUCTION OF HORMONE INTO FEMALE REPRODUCTIVE, VIA NATURAL OR ARTIFICIAL OPENING: ICD-10-PCS | Performed by: OBSTETRICS & GYNECOLOGY

## 2023-06-30 PROCEDURE — 59409 OBSTETRICAL CARE: CPT | Performed by: OBSTETRICS & GYNECOLOGY

## 2023-06-30 PROCEDURE — 3E033VJ INTRODUCTION OF OTHER HORMONE INTO PERIPHERAL VEIN, PERCUTANEOUS APPROACH: ICD-10-PCS | Performed by: OBSTETRICS & GYNECOLOGY

## 2023-06-30 RX ORDER — SODIUM CHLORIDE 9 MG/ML
INJECTION, SOLUTION INTRAVENOUS ONCE
Status: DISCONTINUED | OUTPATIENT
Start: 2023-06-30 | End: 2023-07-02

## 2023-06-30 RX ORDER — TRISODIUM CITRATE DIHYDRATE AND CITRIC ACID MONOHYDRATE 500; 334 MG/5ML; MG/5ML
30 SOLUTION ORAL AS NEEDED
Status: DISCONTINUED | OUTPATIENT
Start: 2023-06-30 | End: 2023-06-30 | Stop reason: HOSPADM

## 2023-06-30 RX ORDER — OXYTOCIN 10 [USP'U]/ML
INJECTION, SOLUTION INTRAMUSCULAR; INTRAVENOUS
Status: DISPENSED
Start: 2023-06-30 | End: 2023-07-01

## 2023-06-30 RX ORDER — DEXTROSE, SODIUM CHLORIDE, SODIUM LACTATE, POTASSIUM CHLORIDE, AND CALCIUM CHLORIDE 5; .6; .31; .03; .02 G/100ML; G/100ML; G/100ML; G/100ML; G/100ML
INJECTION, SOLUTION INTRAVENOUS AS NEEDED
Status: DISCONTINUED | OUTPATIENT
Start: 2023-06-30 | End: 2023-06-30 | Stop reason: HOSPADM

## 2023-06-30 RX ORDER — CEFAZOLIN SODIUM/WATER 2 G/20 ML
SYRINGE (ML) INTRAVENOUS
Status: COMPLETED
Start: 2023-06-30 | End: 2023-06-30

## 2023-06-30 RX ORDER — BISACODYL 10 MG
10 SUPPOSITORY, RECTAL RECTAL ONCE AS NEEDED
Status: DISCONTINUED | OUTPATIENT
Start: 2023-06-30 | End: 2023-07-02

## 2023-06-30 RX ORDER — IBUPROFEN 600 MG/1
600 TABLET ORAL EVERY 6 HOURS
Status: DISCONTINUED | OUTPATIENT
Start: 2023-06-30 | End: 2023-07-02

## 2023-06-30 RX ORDER — MISOPROSTOL 200 UG/1
TABLET ORAL
Status: COMPLETED
Start: 2023-06-30 | End: 2023-06-30

## 2023-06-30 RX ORDER — NALBUPHINE HYDROCHLORIDE 10 MG/ML
2.5 INJECTION, SOLUTION INTRAMUSCULAR; INTRAVENOUS; SUBCUTANEOUS
Status: DISCONTINUED | OUTPATIENT
Start: 2023-06-30 | End: 2023-07-02

## 2023-06-30 RX ORDER — BUPIVACAINE HYDROCHLORIDE 2.5 MG/ML
INJECTION, SOLUTION EPIDURAL; INFILTRATION; INTRACAUDAL AS NEEDED
Status: DISCONTINUED | OUTPATIENT
Start: 2023-06-30 | End: 2023-06-30 | Stop reason: SURG

## 2023-06-30 RX ORDER — IBUPROFEN 600 MG/1
600 TABLET ORAL ONCE AS NEEDED
Status: DISCONTINUED | OUTPATIENT
Start: 2023-06-30 | End: 2023-06-30 | Stop reason: HOSPADM

## 2023-06-30 RX ORDER — TERBUTALINE SULFATE 1 MG/ML
0.25 INJECTION, SOLUTION SUBCUTANEOUS AS NEEDED
Status: DISCONTINUED | OUTPATIENT
Start: 2023-06-30 | End: 2023-06-30 | Stop reason: HOSPADM

## 2023-06-30 RX ORDER — DOCUSATE SODIUM 100 MG/1
100 CAPSULE, LIQUID FILLED ORAL
Status: DISCONTINUED | OUTPATIENT
Start: 2023-06-30 | End: 2023-07-02

## 2023-06-30 RX ORDER — LIDOCAINE HYDROCHLORIDE AND EPINEPHRINE 15; 5 MG/ML; UG/ML
INJECTION, SOLUTION EPIDURAL AS NEEDED
Status: DISCONTINUED | OUTPATIENT
Start: 2023-06-30 | End: 2023-06-30 | Stop reason: SURG

## 2023-06-30 RX ORDER — ACETAMINOPHEN 500 MG
500 TABLET ORAL EVERY 6 HOURS PRN
Status: DISCONTINUED | OUTPATIENT
Start: 2023-06-30 | End: 2023-07-02

## 2023-06-30 RX ORDER — METHYLERGONOVINE MALEATE 0.2 MG/ML
INJECTION INTRAVENOUS
Status: COMPLETED
Start: 2023-06-30 | End: 2023-06-30

## 2023-06-30 RX ORDER — ACETAMINOPHEN 500 MG
500 TABLET ORAL EVERY 6 HOURS PRN
Status: DISCONTINUED | OUTPATIENT
Start: 2023-06-30 | End: 2023-06-30 | Stop reason: HOSPADM

## 2023-06-30 RX ORDER — CARBOPROST TROMETHAMINE 250 UG/ML
INJECTION, SOLUTION INTRAMUSCULAR
Status: DISPENSED
Start: 2023-06-30 | End: 2023-07-01

## 2023-06-30 RX ORDER — BUPIVACAINE HCL/0.9 % NACL/PF 0.25 %
5 PLASTIC BAG, INJECTION (ML) EPIDURAL AS NEEDED
Status: DISCONTINUED | OUTPATIENT
Start: 2023-06-30 | End: 2023-07-02

## 2023-06-30 RX ORDER — TRANEXAMIC ACID 10 MG/ML
INJECTION, SOLUTION INTRAVENOUS
Status: COMPLETED
Start: 2023-06-30 | End: 2023-06-30

## 2023-06-30 RX ORDER — ACETAMINOPHEN 500 MG
1000 TABLET ORAL EVERY 6 HOURS PRN
Status: DISCONTINUED | OUTPATIENT
Start: 2023-06-30 | End: 2023-06-30 | Stop reason: HOSPADM

## 2023-06-30 RX ORDER — CEFAZOLIN SODIUM/WATER 2 G/20 ML
2 SYRINGE (ML) INTRAVENOUS EVERY 8 HOURS
Status: COMPLETED | OUTPATIENT
Start: 2023-06-30 | End: 2023-07-01

## 2023-06-30 RX ORDER — SIMETHICONE 80 MG
80 TABLET,CHEWABLE ORAL 3 TIMES DAILY PRN
Status: DISCONTINUED | OUTPATIENT
Start: 2023-06-30 | End: 2023-07-02

## 2023-06-30 RX ORDER — ONDANSETRON 2 MG/ML
4 INJECTION INTRAMUSCULAR; INTRAVENOUS EVERY 6 HOURS PRN
Status: DISCONTINUED | OUTPATIENT
Start: 2023-06-30 | End: 2023-06-30 | Stop reason: HOSPADM

## 2023-06-30 RX ORDER — ACETAMINOPHEN 500 MG
1000 TABLET ORAL EVERY 6 HOURS PRN
Status: DISCONTINUED | OUTPATIENT
Start: 2023-06-30 | End: 2023-07-02

## 2023-06-30 RX ORDER — SODIUM CHLORIDE, SODIUM LACTATE, POTASSIUM CHLORIDE, CALCIUM CHLORIDE 600; 310; 30; 20 MG/100ML; MG/100ML; MG/100ML; MG/100ML
INJECTION, SOLUTION INTRAVENOUS CONTINUOUS
Status: DISCONTINUED | OUTPATIENT
Start: 2023-06-30 | End: 2023-06-30 | Stop reason: HOSPADM

## 2023-06-30 RX ADMIN — BUPIVACAINE HYDROCHLORIDE 5 ML: 2.5 INJECTION, SOLUTION EPIDURAL; INFILTRATION; INTRACAUDAL at 18:16:00

## 2023-06-30 RX ADMIN — LIDOCAINE HYDROCHLORIDE AND EPINEPHRINE 3 ML: 15; 5 INJECTION, SOLUTION EPIDURAL at 11:25:00

## 2023-06-30 NOTE — PROGRESS NOTES
Pt is a 29year old female admitted to 108/108-A,      Pt is 39w0d intra-uterine pregnancy. Patient presents with IOL. Denies any leaking of fluid. Reports +fetal movement. History obtained, consents signed. Oriented to room, staff, and plan of care.

## 2023-06-30 NOTE — PLAN OF CARE
Problem: BIRTH - VAGINAL/ SECTION  Goal: Fetal and maternal status remain reassuring during the birth process  Description: INTERVENTIONS:  - Monitor vital signs  - Monitor fetal heart rate  - Monitor uterine activity  - Monitor labor progression (vaginal delivery)  - DVT prophylaxis (C/S delivery)  - Surgical antibiotic prophylaxis (C/S delivery)  Outcome: Progressing     Problem: PAIN - ADULT  Goal: Verbalizes/displays adequate comfort level or patient's stated pain goal  Description: INTERVENTIONS:  - Encourage pt to monitor pain and request assistance  - Assess pain using appropriate pain scale  - Administer analgesics based on type and severity of pain and evaluate response  - Implement non-pharmacological measures as appropriate and evaluate response  - Consider cultural and social influences on pain and pain management  - Manage/alleviate anxiety  - Utilize distraction and/or relaxation techniques  - Monitor for opioid side effects  - Notify MD/LIP if interventions unsuccessful or patient reports new pain  - Anticipate increased pain with activity and pre-medicate as appropriate  Outcome: Progressing

## 2023-07-01 PROBLEM — Z34.90 PREGNANCY: Status: RESOLVED | Noted: 2017-06-17 | Resolved: 2023-07-01

## 2023-07-01 PROBLEM — Z34.90 PREGNANCY (HCC): Status: RESOLVED | Noted: 2017-06-17 | Resolved: 2023-07-01

## 2023-07-01 LAB
BASOPHILS # BLD AUTO: 0.07 X10(3) UL (ref 0–0.2)
BASOPHILS NFR BLD AUTO: 0.3 %
EOSINOPHIL # BLD AUTO: 0.06 X10(3) UL (ref 0–0.7)
EOSINOPHIL NFR BLD AUTO: 0.3 %
ERYTHROCYTE [DISTWIDTH] IN BLOOD BY AUTOMATED COUNT: 13.8 %
HCT VFR BLD AUTO: 33.2 %
HGB BLD-MCNC: 10.6 G/DL
IMM GRANULOCYTES # BLD AUTO: 0.15 X10(3) UL (ref 0–1)
IMM GRANULOCYTES NFR BLD: 0.7 %
LYMPHOCYTES # BLD AUTO: 2.12 X10(3) UL (ref 1–4)
LYMPHOCYTES NFR BLD AUTO: 10.1 %
MCH RBC QN AUTO: 28.2 PG (ref 26–34)
MCHC RBC AUTO-ENTMCNC: 31.9 G/DL (ref 31–37)
MCV RBC AUTO: 88.3 FL
MONOCYTES # BLD AUTO: 0.81 X10(3) UL (ref 0.1–1)
MONOCYTES NFR BLD AUTO: 3.9 %
NEUTROPHILS # BLD AUTO: 17.71 X10 (3) UL (ref 1.5–7.7)
NEUTROPHILS # BLD AUTO: 17.71 X10(3) UL (ref 1.5–7.7)
NEUTROPHILS NFR BLD AUTO: 84.7 %
PLATELET # BLD AUTO: 270 10(3)UL (ref 150–450)
RBC # BLD AUTO: 3.76 X10(6)UL
WBC # BLD AUTO: 20.9 X10(3) UL (ref 4–11)

## 2023-07-01 NOTE — PLAN OF CARE
Problem: BIRTH - VAGINAL/ SECTION  Goal: Fetal and maternal status remain reassuring during the birth process  Description: INTERVENTIONS:  - Monitor vital signs  - Monitor fetal heart rate  - Monitor uterine activity  - Monitor labor progression (vaginal delivery)  - DVT prophylaxis (C/S delivery)  - Surgical antibiotic prophylaxis (C/S delivery)  Outcome: Completed     Problem: PAIN - ADULT  Goal: Verbalizes/displays adequate comfort level or patient's stated pain goal  Description: INTERVENTIONS:  - Encourage pt to monitor pain and request assistance  - Assess pain using appropriate pain scale  - Administer analgesics based on type and severity of pain and evaluate response  - Implement non-pharmacological measures as appropriate and evaluate response  - Consider cultural and social influences on pain and pain management  - Manage/alleviate anxiety  - Utilize distraction and/or relaxation techniques  - Monitor for opioid side effects  - Notify MD/LIP if interventions unsuccessful or patient reports new pain  - Anticipate increased pain with activity and pre-medicate as appropriate  Outcome: Completed     Problem: ANXIETY  Goal: Will report anxiety at manageable levels  Description: INTERVENTIONS:  - Administer medication as ordered  - Teach and rehearse alternative coping skills  - Provide emotional support with 1:1 interaction with staff  Outcome: Completed     Problem: Patient/Family Goals  Goal: Patient/Family Long Term Goal  Description: Patient's Long Term Goal: Uncomplicated and safe delivery    Interventions:  - See additional Care Plan goals for specific interventions  Outcome: Completed  Goal: Patient/Family Short Term Goal  Description: Patient's Short Term Goal: Adequate pain control    Interventions:   - See additional Care Plan goals for specific interventions  Outcome: Completed     Problem: SAFETY ADULT - FALL  Goal: Free from fall injury  Description: INTERVENTIONS:  - Assess pt frequently for physical needs  - Identify cognitive and physical deficits and behaviors that affect risk of falls.   - [place] fall precautions as indicated by assessment.  - Educate pt/family on patient safety including physical limitations  - Instruct pt to call for assistance with activity based on assessment  - Modify environment to reduce risk of injury  - Provide assistive devices as appropriate  - Consider OT/PT consult to assist with strengthening/mobility  - Encourage toileting schedule  Outcome: Completed

## 2023-07-01 NOTE — L&D DELIVERY NOTE
Katharine Valenzuela, Girl [[id number]]      Labor Events     labor?: No   steroids?: None  Antibiotics received during labor?: No  Antibiotics (enter # doses in comment): none  Rupture date/time: 2023 1237     Rupture type: AROM  Fluid color: Clear  Induction: Misoprostol, Oxytocin, Cervical Ripening Balloon  Indications for induction: Elective  Induction comment: Hx PPHx2    Intrapartum & labor complications: None       Labor Event Times    Labor onset date/time: 2023 1136  Dilation complete date/time: 2023        Presentation    Presentation: Vertex       Operative Delivery    No data filed       Shoulder Dystocia    No data filed       Anesthesia    Method: Epidural               Delivery      Delivery date/time:  23 21:00:00   Delivery type: Normal spontaneous vaginal delivery    Details:            Delivery location: delivery room          Delivery Providers    Delivering Clinician: Luz Ro DO   Delivery personnel:  Provider Role   Zeeshan Shoulders, RN Baby Nurse   Hodan Raines, RN Delivery Nurse             Cord    Vessels: 3 Vessels  Complications: None  Timed cord clamping: Yes  Cord blood disposition: to lab  Gases sent?: No       Resuscitation    Method: None       [place] Measurements      Weight: 3050 g 6 lb 11.6 oz Length: 45.7 cm               Placenta    Date/time: 2023  Appearance: Intact  Disposition: held for future pathology       Apgars    Living status: Living   Apgar Scoring Key:    0 1 2    Skin color Blue or pale Acrocyanotic Completely pink    Heart rate Absent <100 bpm >100 bpm    Reflex irritability No response Grimace Cry or active withdrawal    Muscle tone Limp Some flexion Active motion    Respiratory effort Absent Weak cry; hypoventilation Good, crying                1 Minute:  5 Minute:  10 Minute:  15 Minute:  20 Minute:      Skin color: 0  1       Heart rate: 2  2       Reflex irritablity: 2  2       Muscle tone: 2  2       Respiratory effort: 2  2       Total: 8  9               [place] disposition: with mother             Skin to Skin    Skin to skin with: Mother       Vaginal Count    Initial count RN: Saumya Martinez RN  Initial count Tech: [place] Anabel   Sponges   Sharps    Initial counts 11   0    Final counts               Delivery (Maternal)    Episiotomy: None  Perineal lacerations: None      Labial laceration: bilateral Repaired?: Yes               29 y.o.  at 43 weeks with  female infant, APGARS 8/9. Weight 6lb12 oz. Body and shoulders easily delivered. Bulb suctioned at perineum. Cord clamped x2 and cut. Placenta delivered spontaneously, intact. B/l labial lacerations repaired with 3-0 chromic. H/o postpartum hemorrhage after 2 previous deliveries Methergen IM, cytotec 1000 mcg rectaly and tranexamic acid IV given.   cc

## 2023-07-01 NOTE — PROGRESS NOTES
Patient admitted to room #2208. Patient arrived in wheelchair. Patient moved to bed with assist x2. ID bands verified with Labor RN. Hugs and kisses already in place. Patient and  oriented to room and plan of care. Bed low and locked. Side rails up x 2. Call light demonstrated and given to patient. Patient to call for assistance first time to bathroom.

## 2023-07-02 VITALS
SYSTOLIC BLOOD PRESSURE: 102 MMHG | DIASTOLIC BLOOD PRESSURE: 68 MMHG | HEIGHT: 60 IN | HEART RATE: 67 BPM | WEIGHT: 176 LBS | BODY MASS INDEX: 34.55 KG/M2 | OXYGEN SATURATION: 97 % | RESPIRATION RATE: 17 BRPM | TEMPERATURE: 98 F

## 2023-07-02 LAB
BLOOD TYPE BARCODE: 5100
UNIT VOLUME: 350 ML

## 2023-07-02 NOTE — PROGRESS NOTES
Discharge patient home as order. Teaching complete, patient feel comfortable in taking care of herself and  infant. Hugs and kisses off, send both mom and infant to their family car @ 200. show

## 2023-07-05 ENCOUNTER — PATIENT OUTREACH (OUTPATIENT)
Dept: CASE MANAGEMENT | Age: 33
End: 2023-07-05

## 2023-07-05 NOTE — PROGRESS NOTES
1st attempt OBGYN Post Partum apt request  (delivered 06/30)    Dr Leonardo Garcia  [facility]  [facility], [place] [address] [facility] [phone]  Follow up 6 weeks  LVM to call [phone] to assist w/scheduling apt; will try again

## 2023-07-06 NOTE — PROGRESS NOTES
2nd attempt OBGYN Post Partum apt request  (delivered 06/30)     Dr Gato Melton  [facility]  [address] [place] [address] [phone]  Follow up 6 weeks  Apt made:   agueda 08/17 @12:30pm w/NIRAJ Chacon  Confirmed w/pt  Closing encounter

## 2023-07-07 ENCOUNTER — TELEPHONE (OUTPATIENT)
Dept: OBGYN UNIT | Facility: HOSPITAL | Age: 33
End: 2023-07-07

## 2023-07-21 ENCOUNTER — HOSPITAL ENCOUNTER (EMERGENCY)
Facility: HOSPITAL | Age: 33
Discharge: HOME | End: 2023-07-21
Attending: EMERGENCY MEDICINE
Payer: MEDICAID

## 2023-07-21 ENCOUNTER — APPOINTMENT (OUTPATIENT)
Dept: GENERAL RADIOLOGY | Facility: HOSPITAL | Age: 33
End: 2023-07-21
Attending: EMERGENCY MEDICINE
Payer: MEDICAID

## 2023-07-21 ENCOUNTER — APPOINTMENT (OUTPATIENT)
Dept: CT IMAGING | Facility: HOSPITAL | Age: 33
End: 2023-07-21
Attending: EMERGENCY MEDICINE
Payer: MEDICAID

## 2023-07-21 VITALS
SYSTOLIC BLOOD PRESSURE: 106 MMHG | RESPIRATION RATE: 17 BRPM | DIASTOLIC BLOOD PRESSURE: 66 MMHG | TEMPERATURE: 97 F | BODY MASS INDEX: 32.79 KG/M2 | HEIGHT: 60 IN | WEIGHT: 167 LBS | HEART RATE: 103 BPM | OXYGEN SATURATION: 95 %

## 2023-07-21 DIAGNOSIS — N39.0 URINARY TRACT INFECTION WITHOUT HEMATURIA, SITE UNSPECIFIED: Primary | ICD-10-CM

## 2023-07-21 LAB
ALBUMIN SERPL-MCNC: 3.6 G/DL (ref 3.4–5)
ALBUMIN/GLOB SERPL: 0.9 {RATIO} (ref 1–2)
ALP LIVER SERPL-CCNC: 97 U/L
ALT SERPL-CCNC: 33 U/L
ANION GAP SERPL CALC-SCNC: 5 MMOL/L (ref 0–18)
AST SERPL-CCNC: 18 U/L (ref 15–37)
BASOPHILS # BLD AUTO: 0.05 X10(3) UL (ref 0–0.2)
BASOPHILS NFR BLD AUTO: 0.5 %
BILIRUB SERPL-MCNC: 0.5 MG/DL (ref 0.1–2)
BILIRUB UR QL STRIP.AUTO: NEGATIVE
BUN BLD-MCNC: 9 MG/DL (ref 7–18)
CALCIUM BLD-MCNC: 8.5 MG/DL (ref 8.5–10.1)
CHLORIDE SERPL-SCNC: 108 MMOL/L (ref 98–112)
CO2 SERPL-SCNC: 25 MMOL/L (ref 21–32)
COLOR UR AUTO: YELLOW
CREAT BLD-MCNC: 0.66 MG/DL
EGFRCR SERPLBLD CKD-EPI 2021: 121 ML/MIN/1.73M2 (ref 60–?)
EOSINOPHIL # BLD AUTO: 0.09 X10(3) UL (ref 0–0.7)
EOSINOPHIL NFR BLD AUTO: 0.9 %
ERYTHROCYTE [DISTWIDTH] IN BLOOD BY AUTOMATED COUNT: 13.2 %
GLOBULIN PLAS-MCNC: 4 G/DL (ref 2.8–4.4)
GLUCOSE BLD-MCNC: 100 MG/DL (ref 70–99)
GLUCOSE UR STRIP.AUTO-MCNC: NEGATIVE MG/DL
HCT VFR BLD AUTO: 39.2 %
HGB BLD-MCNC: 12.6 G/DL
IMM GRANULOCYTES # BLD AUTO: 0.02 X10(3) UL (ref 0–1)
IMM GRANULOCYTES NFR BLD: 0.2 %
KETONES UR STRIP.AUTO-MCNC: NEGATIVE MG/DL
LYMPHOCYTES # BLD AUTO: 1.39 X10(3) UL (ref 1–4)
LYMPHOCYTES NFR BLD AUTO: 13.5 %
MCH RBC QN AUTO: 27.4 PG (ref 26–34)
MCHC RBC AUTO-ENTMCNC: 32.1 G/DL (ref 31–37)
MCV RBC AUTO: 85.2 FL
MONOCYTES # BLD AUTO: 0.4 X10(3) UL (ref 0.1–1)
MONOCYTES NFR BLD AUTO: 3.9 %
NEUTROPHILS # BLD AUTO: 8.36 X10 (3) UL (ref 1.5–7.7)
NEUTROPHILS # BLD AUTO: 8.36 X10(3) UL (ref 1.5–7.7)
NEUTROPHILS NFR BLD AUTO: 81 %
NITRITE UR QL STRIP.AUTO: NEGATIVE
OSMOLALITY SERPL CALC.SUM OF ELEC: 285 MOSM/KG (ref 275–295)
PH UR STRIP.AUTO: 7 [PH] (ref 5–8)
PLATELET # BLD AUTO: 319 10(3)UL (ref 150–450)
POTASSIUM SERPL-SCNC: 3.6 MMOL/L (ref 3.5–5.1)
PROT SERPL-MCNC: 7.6 G/DL (ref 6.4–8.2)
PROT UR STRIP.AUTO-MCNC: NEGATIVE MG/DL
RBC # BLD AUTO: 4.6 X10(6)UL
RBC #/AREA URNS AUTO: >10 /HPF
SARS-COV-2 RNA RESP QL NAA+PROBE: NOT DETECTED
SODIUM SERPL-SCNC: 138 MMOL/L (ref 136–145)
SP GR UR STRIP.AUTO: 1.01 (ref 1–1.03)
UROBILINOGEN UR STRIP.AUTO-MCNC: <2 MG/DL
WBC # BLD AUTO: 10.3 X10(3) UL (ref 4–11)

## 2023-07-21 PROCEDURE — 87430 STREP A AG IA: CPT | Performed by: EMERGENCY MEDICINE

## 2023-07-21 PROCEDURE — 87086 URINE CULTURE/COLONY COUNT: CPT | Performed by: EMERGENCY MEDICINE

## 2023-07-21 PROCEDURE — 99284 EMERGENCY DEPT VISIT MOD MDM: CPT

## 2023-07-21 PROCEDURE — 96365 THER/PROPH/DIAG IV INF INIT: CPT

## 2023-07-21 PROCEDURE — 96361 HYDRATE IV INFUSION ADD-ON: CPT

## 2023-07-21 PROCEDURE — 85025 COMPLETE CBC W/AUTO DIFF WBC: CPT | Performed by: EMERGENCY MEDICINE

## 2023-07-21 PROCEDURE — 80053 COMPREHEN METABOLIC PANEL: CPT | Performed by: EMERGENCY MEDICINE

## 2023-07-21 PROCEDURE — 71045 X-RAY EXAM CHEST 1 VIEW: CPT | Performed by: EMERGENCY MEDICINE

## 2023-07-21 PROCEDURE — 81001 URINALYSIS AUTO W/SCOPE: CPT | Performed by: EMERGENCY MEDICINE

## 2023-07-21 PROCEDURE — 74177 CT ABD & PELVIS W/CONTRAST: CPT | Performed by: EMERGENCY MEDICINE

## 2023-07-21 RX ORDER — ACETAMINOPHEN 500 MG
1000 TABLET ORAL ONCE
Status: COMPLETED | OUTPATIENT
Start: 2023-07-21 | End: 2023-07-21

## 2023-07-21 RX ORDER — IBUPROFEN 600 MG/1
600 TABLET ORAL ONCE
Status: COMPLETED | OUTPATIENT
Start: 2023-07-21 | End: 2023-07-21

## 2023-07-21 RX ORDER — CEPHALEXIN 500 MG/1
500 CAPSULE ORAL 3 TIMES DAILY
Qty: 21 CAPSULE | Refills: 0 | Status: SHIPPED | OUTPATIENT
Start: 2023-07-21 | End: 2023-07-28

## 2023-07-21 NOTE — ED INITIAL ASSESSMENT (HPI)
Pt states she developed headache last night with a fever. Pt 3 weeks postpartum. Pt also states she is constipated and \"bleeding from my anus\".

## 2023-07-22 NOTE — DISCHARGE INSTRUCTIONS
Return to ER if fever persists beyond the next 24 to 48 hours. Return to ER if symptoms worsen or change. Follow-up with your PCP or OB/GYN  - call for appointment    Motrin 600 mg 3 times a day as needed for fever or pain. Tylenol 1 g 3 times a day as needed for fever or pain.     May alternate the above every 3-4 hours

## 2023-07-22 NOTE — ED QUICK NOTES
Patient ambulated to bathroom with steady gait. Educated on clean catch urine specimen collection. She notes discomfort to left breast when breastfeeding. MD notified.

## 2023-07-28 ENCOUNTER — MED REC SCAN ONLY (OUTPATIENT)
Dept: FAMILY MEDICINE CLINIC | Facility: CLINIC | Age: 33
End: 2023-07-28

## 2023-08-17 ENCOUNTER — POSTPARTUM (OUTPATIENT)
Facility: CLINIC | Age: 33
End: 2023-08-17
Payer: MEDICAID

## 2023-08-17 ENCOUNTER — TELEPHONE (OUTPATIENT)
Dept: OBGYN CLINIC | Facility: CLINIC | Age: 33
End: 2023-08-17

## 2023-08-17 VITALS
DIASTOLIC BLOOD PRESSURE: 80 MMHG | HEART RATE: 73 BPM | HEIGHT: 60 IN | BODY MASS INDEX: 30.95 KG/M2 | SYSTOLIC BLOOD PRESSURE: 114 MMHG | WEIGHT: 157.63 LBS

## 2023-08-17 DIAGNOSIS — Z30.011 INITIATION OF OCP (BCP): Primary | ICD-10-CM

## 2023-08-17 DIAGNOSIS — Z12.4 PAP SMEAR FOR CERVICAL CANCER SCREENING: ICD-10-CM

## 2023-08-17 DIAGNOSIS — D22.30 CHANGE IN FACIAL MOLE: ICD-10-CM

## 2023-08-17 PROCEDURE — 3079F DIAST BP 80-89 MM HG: CPT

## 2023-08-17 PROCEDURE — 0503F POSTPARTUM CARE VISIT: CPT

## 2023-08-17 PROCEDURE — 88175 CYTOPATH C/V AUTO FLUID REDO: CPT

## 2023-08-17 PROCEDURE — 3074F SYST BP LT 130 MM HG: CPT

## 2023-08-17 PROCEDURE — 3008F BODY MASS INDEX DOCD: CPT

## 2023-08-17 RX ORDER — ACETAMINOPHEN AND CODEINE PHOSPHATE 120; 12 MG/5ML; MG/5ML
0.35 SOLUTION ORAL DAILY
Qty: 28 TABLET | Refills: 5 | Status: SHIPPED | OUTPATIENT
Start: 2023-08-17 | End: 2024-08-16

## 2023-08-17 NOTE — TELEPHONE ENCOUNTER
----- Message from NIRAJ Caban sent at 8/17/2023  1:04 PM CDT -----  Regarding: derm referral for mole  I placed a referral for her to see dermatology per pt request. Can you call her and give her the physician office information so she can make an appointment?

## 2023-08-17 NOTE — TELEPHONE ENCOUNTER
Spoke with ptDonnell Knightence placed an order for her to dermatology. I gave her Dr. Karen Mcmanus information but told her she may need to call her insurance to see what providers are covered. Verbalized understanding.

## 2023-08-17 NOTE — PROGRESS NOTES
HPI    Esperanza Spencer is a 34year old female  here for 6 week post-partum visit. Patient delivered a  female infant on 23  via . Patient desires implant  for contraception. Patient is breast & bottle feeding. Patient denies symptoms of depression, [place]  depression scale score of 0 out of 30. [place]  DEPRESSION SCALE  I have been able to laugh and see the funny side of things: As much as I always could  I have looked forward with enjoyment to things: As much as I ever did  I have been anxious or worried for no good reason: No, not at all  I have felt scared or panicky for no good reason: No, not at all  Things have been getting on top of me: No, I have been coping as well as ever  I have been so unhappy that I have had difficulty sleeping: Not at all  I have felt sad or miserable: No, not at all  I have been so unhappy that I have been crying: No, never  The thought of harming myself has occurred to me: Never  Total: 0    OBSTETRIC HISTORY  OB History    Para Term  AB Living   3 3 3 0 0 3   SAB IAB Ectopic Multiple Live Births   0 0 0 0 3      # Outcome Date GA Lbr Jorge/2nd Weight Sex Delivery Anes PTL Lv   3 Term 23 39w0d 08:28 / 00:56 6 lb 11.6 oz (3.05 kg) F NORMAL SPONT EPI N ALFRED   2 Term 17 40w3d 05:40 / 00:08 8 lb 7.6 oz (3.845 kg) F NORMAL SPONT Local N ALFRED   1 Term 16 40w0d  7 lb (3.175 kg) F NORMAL SPONT   ALFRED      Obstetric Comments   Per patient: blood transfusion after both deliveries due to bleeding       GYNE HISTORY        MEDICATIONS:    Current Outpatient Medications:     Norethindrone (ORTHO MICRONOR) 0.35 MG Oral Tab, Take 1 tablet (0.35 mg total) by mouth daily. , Disp: 28 tablet, Rfl: 5    IRON OR, , Disp: , Rfl:     prenatal vitamin with DHA 27-0.8-228 MG Oral Cap, Take 1 capsule by mouth daily. , Disp: , Rfl:     ALLERGIES:  No Known Allergies    PHYSICAL EXAM  Blood pressure 114/80, pulse 73, height 60\", weight 157 lb 9.6 oz (71.5 kg), last menstrual period 09/30/2022, currently breastfeeding. General:  Well nourished, well developed woman in no acute distress  Abdomen:  soft, nontender, no masses  External Genitalia: normal appearance, hair distribution, and no lesions  Vagina:  Normal appearance without lesions, no abnormal discharge  Cervix:  Normal without tenderness on motion  Uterus: normal in size, contour, position, mobility, without tenderness  Adnexa: normal without masses or tenderness  Perineum: well healed perineum  Anus: no hemorroids       ASSESSMENT/PLAN  Heather Lino was seen today for postpartum care. Diagnoses and all orders for this visit:    Initiation of OCP (BCP)  -     Norethindrone (ORTHO MICRONOR) 0.35 MG Oral Tab; Take 1 tablet (0.35 mg total) by mouth daily. Discussed all options of birthcontrol including ocps, minipill, Mirena or Paragard IUD, nuvaring, orthoevra patch, nexplanon, Depoprovera, condoms or tubal sterilization options. Patient has chosen Nexplanon. Patient to return for annual gyne exam in February. Started her on OCP until she can get the implant placed.      She is requesting derm referral to have her facial mole to be evaluated - order placed today

## 2023-11-07 PROBLEM — I10 ESSENTIAL HYPERTENSION: Status: ACTIVE | Noted: 2021-10-14

## 2023-11-07 PROBLEM — A63.0 ANAL CONDYLOMA: Status: ACTIVE | Noted: 2021-10-18

## 2023-11-07 PROBLEM — K64.4 EXTERNAL HEMORRHOID: Status: ACTIVE | Noted: 2021-10-14

## 2023-11-09 ENCOUNTER — OFFICE VISIT (OUTPATIENT)
Dept: FAMILY MEDICINE CLINIC | Facility: CLINIC | Age: 33
End: 2023-11-09
Payer: MEDICAID

## 2023-11-09 VITALS
HEART RATE: 87 BPM | HEIGHT: 59.91 IN | RESPIRATION RATE: 16 BRPM | BODY MASS INDEX: 31.57 KG/M2 | TEMPERATURE: 98 F | DIASTOLIC BLOOD PRESSURE: 80 MMHG | WEIGHT: 160.81 LBS | SYSTOLIC BLOOD PRESSURE: 104 MMHG | OXYGEN SATURATION: 97 %

## 2023-11-09 DIAGNOSIS — Z13.228 SCREENING FOR ENDOCRINE, METABOLIC, AND IMMUNITY DISORDER: ICD-10-CM

## 2023-11-09 DIAGNOSIS — Z13.0 SCREENING FOR ENDOCRINE, METABOLIC, AND IMMUNITY DISORDER: ICD-10-CM

## 2023-11-09 DIAGNOSIS — Z13.29 SCREENING FOR ENDOCRINE, METABOLIC, AND IMMUNITY DISORDER: ICD-10-CM

## 2023-11-09 DIAGNOSIS — A63.0 ANAL CONDYLOMA: ICD-10-CM

## 2023-11-09 DIAGNOSIS — D36.9 ADENOMATOUS POLYP: ICD-10-CM

## 2023-11-09 DIAGNOSIS — Z13.6 SCREENING, ISCHEMIC HEART DISEASE: ICD-10-CM

## 2023-11-09 DIAGNOSIS — Z78.9 BREASTFEEDING (INFANT): ICD-10-CM

## 2023-11-09 DIAGNOSIS — D22.9 BENIGN MOLE: ICD-10-CM

## 2023-11-09 DIAGNOSIS — Z00.00 ANNUAL PHYSICAL EXAM: Primary | ICD-10-CM

## 2023-11-09 DIAGNOSIS — Z23 NEED FOR VACCINATION: ICD-10-CM

## 2023-11-09 PROBLEM — Z86.2 HISTORY OF ANEMIA: Status: ACTIVE | Noted: 2023-11-09

## 2023-11-09 PROBLEM — I10 ESSENTIAL HYPERTENSION: Status: RESOLVED | Noted: 2021-10-14 | Resolved: 2023-11-09

## 2023-11-09 PROCEDURE — 99385 PREV VISIT NEW AGE 18-39: CPT | Performed by: FAMILY MEDICINE

## 2023-11-09 PROCEDURE — 3074F SYST BP LT 130 MM HG: CPT | Performed by: FAMILY MEDICINE

## 2023-11-09 PROCEDURE — 90471 IMMUNIZATION ADMIN: CPT | Performed by: FAMILY MEDICINE

## 2023-11-09 PROCEDURE — 3079F DIAST BP 80-89 MM HG: CPT | Performed by: FAMILY MEDICINE

## 2023-11-09 PROCEDURE — 90686 IIV4 VACC NO PRSV 0.5 ML IM: CPT | Performed by: FAMILY MEDICINE

## 2023-11-09 PROCEDURE — 3008F BODY MASS INDEX DOCD: CPT | Performed by: FAMILY MEDICINE

## 2023-11-14 LAB
% SATURATION: 19 % (CALC) (ref 16–45)
ABSOLUTE BASOPHILS: 42 CELLS/UL (ref 0–200)
ABSOLUTE EOSINOPHILS: 122 CELLS/UL (ref 15–500)
ABSOLUTE LYMPHOCYTES: 2385 CELLS/UL (ref 850–3900)
ABSOLUTE MONOCYTES: 270 CELLS/UL (ref 200–950)
ABSOLUTE NEUTROPHILS: 2480 CELLS/UL (ref 1500–7800)
ALBUMIN/GLOBULIN RATIO: 1.5 (CALC) (ref 1–2.5)
ALBUMIN: 4.6 G/DL (ref 3.6–5.1)
ALKALINE PHOSPHATASE: 71 U/L (ref 31–125)
ALT: 24 U/L (ref 6–29)
AST: 18 U/L (ref 10–30)
BASOPHILS: 0.8 %
BILIRUBIN, TOTAL: 0.3 MG/DL (ref 0.2–1.2)
BUN: 7 MG/DL (ref 7–25)
CALCIUM: 9.3 MG/DL (ref 8.6–10.2)
CARBON DIOXIDE: 30 MMOL/L (ref 20–32)
CHLORIDE: 102 MMOL/L (ref 98–110)
CHOL/HDLC RATIO: 3.5 (CALC)
CHOLESTEROL, TOTAL: 193 MG/DL
CREATININE: 0.56 MG/DL (ref 0.5–0.97)
EGFR: 125 ML/MIN/1.73M2
EOSINOPHILS: 2.3 %
FERRITIN: 15 NG/ML (ref 16–154)
GLOBULIN: 3 G/DL (CALC) (ref 1.9–3.7)
GLUCOSE: 88 MG/DL (ref 65–99)
HDL CHOLESTEROL: 55 MG/DL
HEMATOCRIT: 37.8 % (ref 35–45)
HEMOGLOBIN A1C: 5.6 % OF TOTAL HGB
HEMOGLOBIN: 12.6 G/DL (ref 11.7–15.5)
IRON BINDING CAPACITY: 299 MCG/DL (CALC) (ref 250–450)
IRON, TOTAL: 57 MCG/DL (ref 40–190)
LDL-CHOLESTEROL: 109 MG/DL (CALC)
LYMPHOCYTES: 45 %
MCH: 28.6 PG (ref 27–33)
MCHC: 33.3 G/DL (ref 32–36)
MCV: 85.7 FL (ref 80–100)
MONOCYTES: 5.1 %
MPV: 10.4 FL (ref 7.5–12.5)
NEUTROPHILS: 46.8 %
NON-HDL CHOLESTEROL: 138 MG/DL (CALC)
PLATELET COUNT: 316 THOUSAND/UL (ref 140–400)
POTASSIUM: 3.9 MMOL/L (ref 3.5–5.3)
PROTEIN, TOTAL: 7.6 G/DL (ref 6.1–8.1)
RDW: 13.5 % (ref 11–15)
RED BLOOD CELL COUNT: 4.41 MILLION/UL (ref 3.8–5.1)
SODIUM: 136 MMOL/L (ref 135–146)
TRIGLYCERIDES: 169 MG/DL
TSH W/REFLEX TO FT4: 1.24 MIU/L
WHITE BLOOD CELL COUNT: 5.3 THOUSAND/UL (ref 3.8–10.8)

## 2023-11-16 ENCOUNTER — OFFICE VISIT (OUTPATIENT)
Facility: CLINIC | Age: 33
End: 2023-11-16
Payer: MEDICAID

## 2023-11-16 VITALS
WEIGHT: 162 LBS | BODY MASS INDEX: 31.8 KG/M2 | DIASTOLIC BLOOD PRESSURE: 82 MMHG | HEART RATE: 79 BPM | SYSTOLIC BLOOD PRESSURE: 111 MMHG | HEIGHT: 59.91 IN

## 2023-11-16 DIAGNOSIS — Z01.812 PRE-PROCEDURAL LABORATORY EXAMINATION: ICD-10-CM

## 2023-11-16 DIAGNOSIS — Z30.430 ENCOUNTER FOR INSERTION OF INTRAUTERINE CONTRACEPTIVE DEVICE (IUD): Primary | ICD-10-CM

## 2023-11-16 LAB
CONTROL LINE PRESENT WITH A CLEAR BACKGROUND (YES/NO): YES YES/NO
KIT LOT #: NORMAL NUMERIC
PREGNANCY TEST, URINE: NEGATIVE

## 2023-11-16 PROCEDURE — 81025 URINE PREGNANCY TEST: CPT | Performed by: OBSTETRICS & GYNECOLOGY

## 2023-11-16 PROCEDURE — 3074F SYST BP LT 130 MM HG: CPT | Performed by: OBSTETRICS & GYNECOLOGY

## 2023-11-16 PROCEDURE — 3008F BODY MASS INDEX DOCD: CPT | Performed by: OBSTETRICS & GYNECOLOGY

## 2023-11-16 PROCEDURE — 11981 INSERTION DRUG DLVR IMPLANT: CPT | Performed by: OBSTETRICS & GYNECOLOGY

## 2023-11-16 PROCEDURE — 3079F DIAST BP 80-89 MM HG: CPT | Performed by: OBSTETRICS & GYNECOLOGY

## 2023-11-16 NOTE — PROGRESS NOTES
Nexplanon Insertion    Pregnancy Results: negative from urine test   Birth control method(s) used:    Consent was obtained from the patient. Insertion:    The patient was positioned with her left arm flexed. 2% lidocaine with epinephrine  was used to inject the planned insertion site. Device opened, leonarda confirmed within device. Lateral traction of skin performed while Nexplanon inserted. The Nexplanon was placed  without difficulty. The ridged portion of the applicator was seen once the device was withdrawn. Steri-Strips were applied to the skin incision. A dressing was wrapped around the injected arm. Visit Plan: Both Physician and pt confirmed device by tactile feel. All of the patient's questions were addressed.

## 2024-01-10 ENCOUNTER — APPOINTMENT (OUTPATIENT)
Dept: DERMATOLOGY | Age: 34
End: 2024-01-10

## 2024-01-10 DIAGNOSIS — D22.39 IRRITATED NEVUS OF NOSE: Primary | ICD-10-CM

## 2024-01-10 PROCEDURE — 11311 SHAVE SKIN LESION 0.6-1.0 CM: CPT | Performed by: DERMATOLOGY

## 2024-01-10 PROCEDURE — 11310 SHAVE SKIN LESION 0.5 CM/<: CPT | Performed by: DERMATOLOGY

## 2024-01-10 PROCEDURE — 88304 TISSUE EXAM BY PATHOLOGIST: CPT | Performed by: INTERNAL MEDICINE

## 2024-01-10 PROCEDURE — 88305 TISSUE EXAM BY PATHOLOGIST: CPT | Performed by: INTERNAL MEDICINE

## 2024-01-16 LAB
ASR DISCLAIMER: NORMAL
CASE RPRT: NORMAL
CLINICAL INFO: NORMAL
PATH REPORT.FINAL DX SPEC: NORMAL
PATH REPORT.GROSS SPEC: NORMAL

## 2024-02-28 ENCOUNTER — TELEPHONE (OUTPATIENT)
Facility: CLINIC | Age: 34
End: 2024-02-28

## 2025-02-21 ENCOUNTER — OFFICE VISIT (OUTPATIENT)
Dept: FAMILY MEDICINE CLINIC | Facility: CLINIC | Age: 35
End: 2025-02-21
Payer: MEDICAID

## 2025-02-21 VITALS
DIASTOLIC BLOOD PRESSURE: 66 MMHG | RESPIRATION RATE: 16 BRPM | BODY MASS INDEX: 32 KG/M2 | WEIGHT: 162.81 LBS | OXYGEN SATURATION: 97 % | HEART RATE: 85 BPM | SYSTOLIC BLOOD PRESSURE: 128 MMHG | TEMPERATURE: 98 F

## 2025-02-21 DIAGNOSIS — G50.0 TRIGEMINAL NEURALGIA PAIN: Primary | ICD-10-CM

## 2025-02-21 PROBLEM — Z78.9 BREASTFEEDING (INFANT): Status: RESOLVED | Noted: 2023-11-09 | Resolved: 2025-02-21

## 2025-02-21 PROBLEM — K62.82 AIN GRADE I: Status: ACTIVE | Noted: 2024-09-11

## 2025-02-21 PROCEDURE — 99213 OFFICE O/P EST LOW 20 MIN: CPT | Performed by: FAMILY MEDICINE

## 2025-02-21 RX ORDER — GABAPENTIN 300 MG/1
300 CAPSULE ORAL NIGHTLY
Qty: 30 CAPSULE | Refills: 1 | Status: SHIPPED | OUTPATIENT
Start: 2025-02-21

## 2025-02-21 NOTE — PROGRESS NOTES
CHIEF COMPLAINT:   Chief Complaint   Patient presents with    Headache     X1 month         HPI:     Kailey Gutierrez is a 34 year old female presents for pulsating unilateral head discomfort x 1 month    The following individual(s) verbally consented to be recorded using ambient AI listening technology and understand that they can each withdraw their consent to this listening technology at any point by asking the clinician to turn off or pause the recording:    Patient name: Kailey Gutierrez  Additional names:  none    History of Present Illness  Kailey Gutierrez is a 34 year old female who presents with new onset headaches.    For the past month, she has been experiencing pulsating pains on the sides of her head that were initially infrequent and  recently become more persistent. The headaches are pulsating, primarily occurring at night, and 1 time woke her up at night.. The pain is unilateral, affecting either side of the head, described as a 'pulsada' lasting for seconds and occurring approximately 3-4 times a day. There are no specific triggers, and the pain does not radiate to her face only on the sides of her head.  She is denying neck pain.    The headaches have disrupted her sleep, causing her to wake up 1x at  night recently.  Despite this, she has no sensitivity to light, aura, scotoma, ear pain, sinus pain, nasal congestion, nausea, or vomiting. She experienced a recent episode of dizziness about a week ago but reports no ongoing issues with balance or coordination or recurrent dizziness.    She is not currently breastfeeding and uses a subcutaneous etonogestrel implant for contraception, which she has had f placed after the birth of her son over a year ago. Her menstrual cycle is regular, occurring every one to two months. She does not smoke and reports generally good sleep, except for the disturbances caused by the headaches.    There is no history of similar headaches or migraines in the past. She  denies any neurological conditions in her family history, such as multiple sclerosis. She also denies any dental pain or issues with chewing that could provoke the headaches.  Denies any numbness or tingling or weakness of her arms or legs.  Denies vision changes or double vision.  No urinary retention or bladder symptoms.       HISTORY:  Past Medical History:    Anal intraepithelial neoplasia II    Dermoid cyst of right ovary    Gallstones    HPV (human papilloma virus) anogenital infection      History reviewed. No pertinent surgical history.   Family History   Problem Relation Age of Onset    Diabetes Father         unknown      Social History:   Social History     Socioeconomic History    Marital status: Single   Tobacco Use    Smoking status: Former     Current packs/day: 0.00     Types: Cigarettes     Start date: 2019     Quit date: 2020     Years since quittin.1    Smokeless tobacco: Former   Vaping Use    Vaping status: Never Used   Substance and Sexual Activity    Alcohol use: Not Currently     Comment: rarely    Drug use: No   Other Topics Concern    Caffeine Concern Yes    Exercise No   Social History Narrative    ** Merged History Encounter **          Social Drivers of Health     Food Insecurity: No Food Insecurity (2023)    Food Insecurity     Food Insecurity: Never true   Transportation Needs: Unknown (2023)    Transportation Needs     Car Seat: Yes   Stress: No Stress Concern Present (2023)    Stress     Feeling of Stress : No   Housing Stability: Low Risk  (2023)    Housing Stability     Housing Instability: No        Medications (Active prior to today's visit):  Current Outpatient Medications   Medication Sig Dispense Refill    Etonogestrel 68 MG Subcutaneous Implant Inject into the skin As Directed.         Allergies:  Allergies[1]    PSFH elements reviewed from today and agreed except as otherwise stated in HPI.  PHYSICAL EXAM:   /66 (BP Location: Left arm,  Patient Position: Sitting, Cuff Size: adult)   Pulse 85   Temp 98 °F (36.7 °C) (Temporal)   Resp 16   Wt 162 lb 12.8 oz (73.8 kg)   LMP 02/05/2025 (Exact Date)   SpO2 97%   Breastfeeding No   BMI 31.89 kg/m²   Vital signs reviewed.Appears stated age, well groomed.  Physical Exam   GENERAL: well developed, well nourished,in no apparent distress  EYES; PERRLA, EOM-i B/L. Sclera clear and non icteric bilateral  SKIN: no rashes,no suspicious lesions, scalp is clear-no rashes  HEENT: atraumatic, normocephalic, TM intact and clear  NECK: supple,no adenopathy,no bruits, no JVD  LUNGS: clear to auscultation bilateral. No RRW. Good inspiratory and expiratory effort  CARDIO: RRR without murmur, clear S1, S2  VS: no carotid artery bruit bilateral.    GI: good BS's,no masses,No HSM or tenderness.   EXTREMITIES: no cyanosis, clubbing or edema, bilateral. No calf tenderness  NEURO: no focal deficits. AOx3.PERRL EOMI bilateral. CN II-XII grossly intact. NL gait. Normal u/LE bilateral DTR +2/4 and symmetric. Normal sensation to touch intact.Normal finger to nose test. Normal muscle strength +5/5 b/l and equal and symmetric of upper/lower extremity. No pronator drift.     LABS        REVIEWED THIS VISIT  ASSESSMENT/PLAN:   34 year old female with    1. Trigeminal neuralgia pain  - NEURO - INTERNAL  -Start gabapentin 300 MG Oral Cap; Take 1 capsule (300 mg total) by mouth nightly.  Dispense: 30 capsule; Refill: 1    Assessment & Plan  Trigeminal Neuralgia  New onset of unilateral, pulsating head pains for one month, occurring intermittently throughout the day and night, lasting seconds. Pain is described as a pulsating sensation, primarily on one side of the head at a time, sometimes at the back of the head. No associated visual disturbances, nausea, vomiting, or neck pain. No history of migraines. Differential diagnosis includes trigeminal neuralgia due to the nature of the pain and its anatomical distribution. Discussed the  condition, its potential causes, and treatment options. Explained that trigeminal neuralgia is often treated with anti-seizure medications like gabapentin, which can cause dizziness and fatigue. Further evaluation by a neurologist is necessary.  - Prescribe gabapentin to be taken once nightly.  -Risk benefits and side effects of gabapentin discussed including dizziness, dry mouth, sedation, risk of dependency, need to be weaned off of taking chronically for several weeks.  - Refer to a neurologist for further evaluation and management.    General Health Maintenance  Using etonogestrel implant for contraception. No smoking. Regular menstrual cycles every one to two months. No significant family history of neurological diseases such as multiple sclerosis.  - Continue current contraceptive method.  - Monitor menstrual cycles and report any irregularities.    Follow-up  - Schedule follow-up appointment in six weeks for physical examination.  - Ensure she contacts neurologist for evaluation.    The patient and provider have a longitudinal relationship to address/treat the serious or complex condition as stated in this encounter.      Meds This Visit:  Requested Prescriptions     Signed Prescriptions Disp Refills    gabapentin 300 MG Oral Cap 30 capsule 1     Sig: Take 1 capsule (300 mg total) by mouth nightly.       Health Maintenance:  Health Maintenance   Topic Date Due    Annual Physical  11/09/2024    COVID-19 Vaccine (3 - 2024-25 season) 03/21/2025 (Originally 9/1/2024)    Influenza Vaccine (1) 06/30/2025 (Originally 10/1/2024)    Pap Smear  08/17/2026    DTaP,Tdap,and Td Vaccines (2 - Td or Tdap) 06/20/2027    Annual Depression Screening  Completed    Pneumococcal Vaccine: Birth to 50yrs  Aged Out    Meningococcal B Vaccine  Aged Out         Patient/Caregiver Education: Patient/Caregiver Education: There are no barriers to learning. Medical education done.   Outcome: Patient verbalizes understanding. Patient is  notified to call with any questions, comp lications, allergies, or worsening or changing symptoms.  Patient is to call with any side effects or complications from the treatments as a result of today.     Problem List:     Patient Active Problem List   Diagnosis    Gall bladder stones    Postpartum anemia (HCC)    External hemorrhoid    Anal condyloma    History of anemia    Breastfeeding (infant)    AIN grade I       Imaging & Referrals:  None     2/21/2025  Michelle Allison, DO      Patient understands plan and follow-up.  Return in about 6 weeks (around 4/4/2025) for annual physical, fasting labs AM.            [1] No Known Allergies

## 2025-04-09 ENCOUNTER — TELEPHONE (OUTPATIENT)
Dept: FAMILY MEDICINE CLINIC | Facility: CLINIC | Age: 35
End: 2025-04-09

## 2025-07-01 ENCOUNTER — OFFICE VISIT (OUTPATIENT)
Dept: FAMILY MEDICINE CLINIC | Facility: CLINIC | Age: 35
End: 2025-07-01
Payer: MEDICAID

## 2025-07-01 VITALS
TEMPERATURE: 98 F | SYSTOLIC BLOOD PRESSURE: 121 MMHG | BODY MASS INDEX: 30.28 KG/M2 | DIASTOLIC BLOOD PRESSURE: 76 MMHG | OXYGEN SATURATION: 98 % | HEIGHT: 60.9 IN | RESPIRATION RATE: 18 BRPM | WEIGHT: 160.38 LBS | HEART RATE: 101 BPM

## 2025-07-01 DIAGNOSIS — Z13.6 SCREENING, ISCHEMIC HEART DISEASE: ICD-10-CM

## 2025-07-01 DIAGNOSIS — Z13.228 SCREENING FOR ENDOCRINE, METABOLIC, AND IMMUNITY DISORDER: ICD-10-CM

## 2025-07-01 DIAGNOSIS — Z23 NEED FOR VACCINATION: ICD-10-CM

## 2025-07-01 DIAGNOSIS — A63.0 ANAL CONDYLOMA: ICD-10-CM

## 2025-07-01 DIAGNOSIS — Z13.29 SCREENING FOR ENDOCRINE, METABOLIC, AND IMMUNITY DISORDER: ICD-10-CM

## 2025-07-01 DIAGNOSIS — N30.00 ACUTE CYSTITIS WITHOUT HEMATURIA: ICD-10-CM

## 2025-07-01 DIAGNOSIS — Z00.00 ANNUAL PHYSICAL EXAM: Primary | ICD-10-CM

## 2025-07-01 DIAGNOSIS — Z13.0 SCREENING FOR ENDOCRINE, METABOLIC, AND IMMUNITY DISORDER: ICD-10-CM

## 2025-07-01 LAB
APPEARANCE: CLEAR
BILIRUBIN: NEGATIVE
CONTROL LINE PRESENT WITH A CLEAR BACKGROUND (YES/NO): YES YES/NO
GLUCOSE (URINE DIPSTICK): NEGATIVE MG/DL
KETONES (URINE DIPSTICK): NEGATIVE MG/DL
KIT LOT #: NORMAL NUMERIC
MULTISTIX LOT#: ABNORMAL NUMERIC
NITRITE, URINE: NEGATIVE
OCCULT BLOOD: NEGATIVE
PH, URINE: 8.5 (ref 4.5–8)
PREGNANCY TEST, URINE: NEGATIVE
PROTEIN (URINE DIPSTICK): 30 MG/DL
SPECIFIC GRAVITY: 1.02 (ref 1–1.03)
URINE-COLOR: YELLOW
UROBILINOGEN,SEMI-QN: 1 MG/DL (ref 0–1.9)

## 2025-07-01 PROCEDURE — 90636 HEP A/HEP B VACC ADULT IM: CPT | Performed by: FAMILY MEDICINE

## 2025-07-01 PROCEDURE — 87086 URINE CULTURE/COLONY COUNT: CPT | Performed by: FAMILY MEDICINE

## 2025-07-01 PROCEDURE — 90651 9VHPV VACCINE 2/3 DOSE IM: CPT | Performed by: FAMILY MEDICINE

## 2025-07-01 PROCEDURE — 81003 URINALYSIS AUTO W/O SCOPE: CPT | Performed by: FAMILY MEDICINE

## 2025-07-01 PROCEDURE — 90471 IMMUNIZATION ADMIN: CPT | Performed by: FAMILY MEDICINE

## 2025-07-01 PROCEDURE — 99395 PREV VISIT EST AGE 18-39: CPT | Performed by: FAMILY MEDICINE

## 2025-07-01 PROCEDURE — 90472 IMMUNIZATION ADMIN EACH ADD: CPT | Performed by: FAMILY MEDICINE

## 2025-07-01 PROCEDURE — 81025 URINE PREGNANCY TEST: CPT | Performed by: FAMILY MEDICINE

## 2025-07-01 RX ORDER — CEPHALEXIN 750 MG/1
750 CAPSULE ORAL 2 TIMES DAILY
Qty: 14 CAPSULE | Refills: 0 | Status: SHIPPED | OUTPATIENT
Start: 2025-07-01

## 2025-07-01 NOTE — PROGRESS NOTES
The following individual(s) verbally consented to be recorded using ambient AI listening technology and understand that they can each withdraw their consent to this listening technology at any point by asking the clinician to turn off or pause the recording:    Patient name: Kailey Gutierrez  Additional names:  spouse

## 2025-07-01 NOTE — PROGRESS NOTES
Laura FOR VISIT:    Kailey Gutierrez is a 34 year old female who presents for an Annual Health Assessment./establish care    History of Present Illness  Kailey Gutierrez is a 34 year old female who presents with urinary symptoms, including burning during urination.    Urinary symptoms began on Saturday, characterized by a burning sensation during urination. No vaginal discharge, odor, incontinence, or hematuria. Symptoms were initially severe but have slightly improved. She has not taken any medication and is only drinking water. She has a history of bladder infections approximately six to seven years ago, and describes the current symptoms as similar to those past infections.    She is not currently breastfeeding and has completed treatment for condyloma, with a follow-up checkup scheduled in a year. Her menstrual periods are irregular, and she has an intrauterine device (IUD) that is due to be removed after ten years of use. She had her last Pap smear in  and is not currently seeing an OBGYN.    There is no family history of breast cancer. She follows no specific diet, though she consumes meat, seafood, and fish. She was previously anemic but is not currently taking iron supplements. She had a mole removed recently and is satisfied with the outcome.    She was born in the  and should have received standard childhood vaccinations. Her tetanus vaccination is valid until . She had chickenpox as a child. There is a family history of diabetes, as her father had the condition.      Long term male partner, monogamous  - postpartum  daughter 24 months  menses: - regular monthly 5 days  Birth control:planning Norplant  Last pap:  2023- normal , neg hpv  History of abnormal pap: Denies  On vit D daily    MVI - no  Calcium  Colonoscopy-2020 due to anal condyloma and abnormal due to polyp? Repeat in 1 year at [facility]- [facility] Juan [phone]  Mammogram- none  Dexa  Exercise - no  Diet-standard-  consumes meat, fish, vegetables, seafood  Dentist regularly- yes  Annual eye exam- no, no glasses, denies vision changes  Etoh: 0 drinks per month  Cigs: never, no vaping  Illicits- denies marijauana  Immunizations: needs hepatitis A and B. Had chicken box  FH significant: denies  FH of breast, colon , cervical, ovarian cancer   Family History   Problem Relation Age of Onset    Diabetes Father         unknown    Hypertension Mother     Anemia Sister     Anemia Sister          Patient Active Problem List   Diagnosis    Gall bladder stones    Postpartum anemia (HCC)    External hemorrhoid    Anal condyloma    History of anemia    AIN grade I    Trigeminal neuralgia pain     Current Outpatient Medications   Medication Sig Dispense Refill    Etonogestrel 68 MG Subcutaneous Implant Inject into the skin As Directed.       Wt Readings from Last 6 Encounters:   07/01/25 160 lb 6.4 oz (72.8 kg)   02/21/25 162 lb 12.8 oz (73.8 kg)   11/16/23 162 lb (73.5 kg)   11/09/23 160 lb 12.8 oz (72.9 kg)   08/17/23 157 lb 9.6 oz (71.5 kg)   07/21/23 167 lb (75.8 kg)     Body mass index is 30.41 kg/m².    No results found for: \"GLUCOSE\"  Lab Results   Component Value Date    CHOLEST 193 11/13/2023     Lab Results   Component Value Date    HDL 55 11/13/2023     No results found for: \"TRIGLY\"  Lab Results   Component Value Date     (H) 11/13/2023     Lab Results   Component Value Date    AST 18 11/13/2023    AST 18 07/21/2023    AST 21 05/16/2023     Lab Results   Component Value Date    ALT 24 11/13/2023    ALT 33 07/21/2023    ALT 23 05/16/2023     Lab Results   Component Value Date    TSH 2.430 07/04/2015     Lab Results   Component Value Date    BUN 7 11/13/2023    BUN 9 07/21/2023    BUN 6 (L) 05/16/2023    CREATSERUM 0.56 11/13/2023    CREATSERUM 0.66 07/21/2023    CREATSERUM 0.30 (L) 05/16/2023       General Health     How would you describe your current health state?: Fair    Type of Diet: Other (no diet)    How do you  maintain positive mental well-being?: Visiting Family, Visiting Friends    How would you describe your daily physical activity?: None    If you are a male age 45-79 or a female age 55-79, do you take aspirin?: No    Have you had any immunizations at another office such as Influenza, Hepatitis B, Tetanus, or Pneumococcal?: No    At any time do you feel concerned for the safety/well-being of yourself and/or your children, in your home or elsewhere?: No     CAGE:     Cut: Have you ever felt you should Cut down on your drinking?: No    Annoyed: Have people Annoyed you by criticizing your drinking?: No    Guilty: Have you ever felt bad or Guilty about your drinking?: No    Eye Opener: Have you ever had a drink first thing in the morning to steady your nerves or to get rid of a hangover (Eye opener)?: No    Scoring  Total Score: 0     Depression Screening (PHQ-2/PHQ-9): Over the LAST 2 WEEKS   Little interest or pleasure in doing things (over the last two weeks)?: Not at all    Feeling down, depressed, or hopeless (over the last two weeks)?: Not at all    PHQ-2 SCORE: 0        PREVENTATIVE SERVICES  INDICATIONS AND SCHEDULE Recommendation Internal Lab or Procedure External Lab or Procedure   Breast Cancer Screening   Every 2 yrs age 50-74 No recommendations at this time    Pap Every 3 yrs age 21-65 or Pap and HPV every 5 yrs age 30-65 Health Maintenance   Topic Date Due    Pap Smear  08/17/2026       Chlamydia Screening Screen Annually age<25, if sex active/on OCPs; >24 high risk No results found for: \"CHLAMYDIA\"    Colonoscopy Screen Every 10 years No recommendations at this time    Flex Sigmoidoscopy Screen  Every 5 years No results found for this or any previous visit.    Fecal Occult Blood  Annually No results found for: \"FOB\", \"OCCULTSTOOL\"    Obesity Screening Screen all adults annually Body mass index is 30.41 kg/m².      Preventive Services for Which Recommendations Vary with Risk Recommendation Internal Lab or  Procedure External Lab or Procedure   Cholesterol Screening Recommended screening varies with age, risk and gender LDL-CHOLESTEROL (mg/dL (calc))   Date Value   11/13/2023 109 (H)       Diabetes Screening  if history of high blood pressure or other  risk factors HEMOGLOBIN A1c (% of total Hgb)   Date Value   11/13/2023 5.6     GLUCOSE (mg/dL)   Date Value   11/13/2023 88         Gonorrhea Screening if high risk No results found for: \"GONOCOCCUS\"    HIV Screening For all adults age 18-65, older adults at increased risk HIV Antigen Antibody Combo (no units)   Date Value   06/14/2023 Non-Reactive   02/23/2023 Negative       Syphilis Screening Screen if pregnant or high risk Rapid Plasma Reagin (no units)   Date Value   02/23/2023 Non Reactive       Hepatitis C Screening Screen those at high risk plus screen one time for adults born 1943-1 965 Hepatitis C Virus Antibody (no units)   Date Value   02/23/2023 Negative       Tuberculosis Screen if high risk No components found for: \"PPDINDURAT\"      Disease Monitoring:    SPECIFIC DISEASE MONITORING Internal Lab or Procedure External Lab or Procedure   Annual Monitoring of Persistent     Medications (ACE/ARB, digoxin, diuretics)    Potassium  Annually POTASSIUM (mmol/L)   Date Value   11/13/2023 3.9         No data to display                Creatinine  Annually CREATININE (mg/dL)   Date Value   11/13/2023 0.56         No data to display                Digoxin Serum Conc  Annually No results found for: \"DIGOXIN\"      No data to display                Diabetes      HgbA1C  Annually HEMOGLOBIN A1c (% of total Hgb)   Date Value   11/13/2023 5.6         No data to display                Creat/alb ratio  Annually CREATININE (mg/dL)   Date Value   11/13/2023 0.56        LDL  Annually LDL-CHOLESTEROL (mg/dL (calc))   Date Value   11/13/2023 109 (H)         No data to display                 Dilated Eye exam  Annually      No data to display                   No data to display                 Asthma  (Annually between  & Dec. 31)    Date of last AAP/ACT and counseling given on importance of controller meds.                 ALLERGIES:   No Known Allergies    CURRENT MEDICATIONS:   Current Outpatient Medications   Medication Sig Dispense Refill    Etonogestrel 68 MG Subcutaneous Implant Inject into the skin As Directed.        MEDICAL INFORMATION:   Past Medical History:    Amenorrhea    After birth    Anal intraepithelial neoplasia II    Dermoid cyst of right ovary    Gallstones    HPV (human papilloma virus) anogenital infection    Transfusion history      Past Surgical History:   Procedure Laterality Date    Colposcopy, cervix w/upper adjacent vagina; w/biopsy(s), cervix        2023      Family History   Problem Relation Age of Onset    Diabetes Father         unknown    Hypertension Mother     Anemia Sister     Anemia Sister       SOCIAL HISTORY:   Social History     Socioeconomic History    Marital status: Single   Tobacco Use    Smoking status: Former     Current packs/day: 0.00     Types: Cigarettes     Start date: 2019     Quit date: 2020     Years since quittin.5    Smokeless tobacco: Former   Vaping Use    Vaping status: Never Used   Substance and Sexual Activity    Alcohol use: Never     Comment: rarely    Drug use: Never   Other Topics Concern    Caffeine Concern No    Stress Concern No    Weight Concern No    Special Diet No    Exercise No    Seat Belt No   Social History Narrative    ** Merged History Encounter **          Social Drivers of Health     Food Insecurity: No Food Insecurity (2025)    NCSS - Food Insecurity     Worried About Running Out of Food in the Last Year: No     Ran Out of Food in the Last Year: No   Transportation Needs: No Transportation Needs (2025)    NCSS - Transportation     Lack of Transportation: No   Stress: No Stress Concern Present (2023)    Stress     Feeling of Stress : No   Housing Stability: Not At Risk  (7/1/2025)    NCSS - Housing/Utilities     Has Housing: Yes     Worried About Losing Housing: No     Unable to Get Utilities: No     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        REVIEW OF SYSTEMS:   GENERAL: feels well otherwise  SKIN: denies any unusual skin lesions  EYES: denies blurred vision or double vision  HEENT: denies nasal congestion, sinus pain or ST  LUNGS: denies shortness of breath with exertion  CARDIOVASCULAR: denies chest pain on exertion  GI: denies abdominal pain, denies heartburn  : denies dysuria, vaginal discharge or itching, periods regular   MUSCULOSKELETAL: denies back pain  NEURO: denies headaches  PSYCHE: denies depression or anxiety  HEMATOLOGIC: denies hx of anemia  ENDOCRINE: denies thyroid history  ALL/ASTHMA: denies hx of allergy or asthma    EXAM:   /76   Pulse 101   Temp 98.1 °F (36.7 °C) (Temporal)   Resp 18   Ht 5' 0.9\" (1.547 m)   Wt 160 lb 6.4 oz (72.8 kg)   LMP  (LMP Unknown)   SpO2 98%   BMI 30.41 kg/m²    No LMP recorded (lmp unknown). Patient has had an implant.   GENERAL: well developed, well nourished, in no apparent distress  SKIN: no rashes, no suspicious lesions, distal nares and nose has 2 skin tag-like moles   EYES:PERRLA, EOMI, normal optic disk, conjunctiva are clear  NECK: supple, no adenopathy, no bruits  CHEST: no chest tenderness  BREAST: Deferred-postpartum 4 months  LUNGS: clear to auscultation  CARDIO: RRR without murmur  GI: good BS's, no masses, HSM or tenderness  : Deferred  RECTAL: Deferred  MUSCULOSKELETAL: back is not tender, FROM of the back  EXTREMITIES: no cyanosis, clubbing or edema  NEURO: Oriented times three, cranial nerves are intact, motor and sensory are grossly intact    ASSESSMENT AND OTHER RELEVANT CHRONIC CONDITIONS:   Kailey Gutierrez is a 34 year old female who presents for an Annual Health Assessment.     PLAN SUMMARY:   1. Annual physical exam  -Encourage Mediterranean diet  -Encouraged exercise 30 minutes to  60 minutes daily x 3-5x weekly for 150 minutes or more to prevent obesity and chronic disease and eliminate stress and its effect on the body.  -encouraged to continue not smoking  -safe sex practices - recommend condom use  -mammogram order placed- if age 40y or older, recommend annual  -self breast exams encouraged monthly  -immunizations-breast-feeding-completed flu shot, needs COVID-19 booster.  Consider hep a and B next year due to currently nursing, recommend annual influenza shot in fall  -Vitamin D3  2000 units daily recommended  -Needs 1000 mg of calcium daily for osteoporosis prevention discussed  -thin prep pap recommended every 3 years if normal     1. Annual physical exam  - CBC With Differential With Platelet  - Comp Metabolic Panel (14)  - Lipid Panel  - TSH W Reflex To Free T4  - HGB A1C [496] [Q]    2. Acute cystitis without hematuria  - URINALYSIS, AUTO, W/O SCOPE- trace leuks, ph 8.5, protein 30  - Urine Culture, Routine [E]  - Urine Preg Test- neg  - cephALEXin 750 MG Oral Cap; Take 1 capsule (750 mg total) by mouth in the morning and 1 capsule (750 mg total) before bedtime.  Dispense: 14 capsule; Refill: 0  Increase fluids and rest  If symptoms not resolved after 2 days, call office if urine culture confirms UTI. If no UTI and symptoms persist >1 week, needs vaginal exam for chlamydia etc.     3. Anal condyloma  Start HPV vaccine  Annual follow up with Dr. Juan    4. Need for vaccination  - Hep A & Hep B (Twinrix) 18yrs & older [89616]  - Hep A & Hep B (Twinrix) 18yrs & older [07645]; Future  - HPV (Gardasil 9) [39266]; Future    5. BMI 30.0-30.9,adult  - HGB A1C [496] [Q]  Lose 25pounds in next year- family hx of DM in father  -Goal BMI 25.0  -weight loss recommended due to increased long term health risks of diabetes, CAD, stroke, HTN and cancer  -follow Mediterranean diet  -options discussed- weight watchers, referral weight loss clinic, if no improvement consider bariatric procedure  -Minimum  exercise 30 minutes 5 days a week aerobic activity goals 150 to 300 minutes weekly.    6. Screening, ischemic heart disease  - Lipid Panel    7. Screening for endocrine, metabolic, and immunity disorder  - CBC With Differential With Platelet  - Comp Metabolic Panel (14)  - TSH W Reflex To Free T4  - HGB A1C [496] [Q]        The patient indicates understanding of these issues and agrees to the plan.  Return in about 2 months (around 9/1/2025) for [place] vaccines HPV and twinrix#2- follow up 1 week if UTI not resolved..    Diet counseling perfomed  Exercise counseling perfomed    SUGGESTED VACCINATIONS - Influenza, Pneumococcal, Zoster, Tetanus     Immunization History   Administered Date(s) Administered    Covid-19 Vaccine Pfizer 30 mcg/0.3 ml 09/21/2021, 10/14/2021    FLULAVAL 6 months & older 0.5 ml Prefilled syringe (27063) 11/09/2023    TDAP 06/20/2017       Influenza Annually   Pneumococcal if high risk   Td/Tdap once then every 10 years   HPV Females 11-26: 3 doses   Zoster (Shingles) 60 and older: one dose   Varicella 2 doses if not immune   MMR 1-2 doses if born after 1954 and not immune

## 2025-07-01 NOTE — PATIENT INSTRUCTIONS
Exercise for a Healthier Heart     Exercise with a friend. When activity is fun, you're more likely to stick with it.   You may wonder how you can improve the health of your heart. If you’re thinking about exercise, you’re on the right track. You don’t need to become an athlete, but you do need a certain amount of brisk exercise to help strengthen your heart. If you have been diagnosed with a heart condition, your doctor may recommend exercise to help stabilize your condition. To help make exercise a habit, choose safe, fun activities.  Be sure to check with your healthcare provider before starting an exercise program.   Why exercise?  Exercising regularly offers many healthy rewards. It can help you do all of the following:  Improve your blood cholesterol level to help prevent further heart trouble  Lower your blood pressure to help prevent a stroke or heart attack  Control diabetes, or reduce your risk of getting this disease  Improve your heart and lung function  Reach and maintain a healthy weight  Make your muscles stronger and more limber so you can stay active  Prevent falls and fractures by slowing the loss of bone mass (osteoporosis)  Manage stress better  Reduce your blood pressure  Improve your sense of self and your body image  Exercise tips  Ease into your routine. Set small goals. Then build on them.  Exercise on most days. Aim for a total of 150 or more minutes of moderate to  vigorous intensity activity each week. Consider 40 minutes, 3 to 4 times a week. For best results, activity should last for 40 minutes on average. It is OK to work up to the 40 minute period over time. Examples of moderate-intensity activity is walking 1 mile in 15 minutes or 30 to 45 minutes of yard work.  Step up your daily activity level. Along with your exercise program, try being more active throughout the day. Walk instead of drive. Do more household tasks or yard work.  Choose one or more activities you enjoy. Walking is  one of the easiest things you can do. You can also try swimming, riding a bike, dancing, or taking an exercise class.  Stop exercising and call your doctor if you:  Have chest pain or feel dizzy or lightheaded  Feel burning, tightness, pressure, or heaviness in your chest, neck, shoulders, back, or arms  Have unusual shortness of breath  Have increased joint or muscle pain  Have palpitations or an irregular heartbeat   Date Last Reviewed: 5/1/2016 © 2000-2018 [employer]. [address] [place] [address]. All rights reserved. This information is not intended as a substitute for professional medical care. Always follow your healthcare professional's instructions.      Eating Heart-Healthy Foods  Eating has a big impact on your heart health. In fact, eating healthier can improve several of your heart risks at once. For instance, it helps you manage weight, cholesterol, and blood pressure. Here are ideas to help you make heart-healthy changes without giving up all the foods and flavors you love.  Getting started  Talk with your healthcare provider about eating plans, such as the DASH or Mediterranean diet. You may also be referred to a dietitian.  Change a few things at a time. Give yourself time to get used to a few eating changes before adding more.  Work to create a tasty, healthy eating plan that you can stick to for the rest of your life.    Goals for healthy eating  Below are some tips to improve your eating habits:  Limit saturated fats and trans fats. Saturated fats raise your levels of cholesterol, so keep these fats to a minimum. They are found in foods such as fatty meats, whole milk, cheese, and palm and coconut oils. Avoid trans fats because they lower good cholesterol as well as raise bad cholesterol. Trans fats are most often found in processed foods.  Reduce sodium (salt) intake. Eating too much salt may increase your blood pressure. Limit your sodium intake to 2,300 milligrams  (mg) per day (the amount in 1 teaspoon of salt), or less if your healthcare provider recommends it. Dining out less often and eating fewer processed foods are two great ways to decrease the amount of salt you consume.  Managing calories. A calorie is a unit of energy. Your body burns calories for fuel, but if you eat more calories than your body burns, the extras are stored as fat. Your healthcare provider can help you create a diet plan to manage your calories. This will likely include eating healthier foods as well as exercising regularly. To help you track your progress, keep a diary to record what you eat and how often you exercise.  Choose the right foods  Aim to make these foods staples of your diet. If you have diabetes, you may have different recommendations than what is listed here:  Fruits and vegetables provide plenty of nutrients without a lot of calories. At meals, fill half your plate with these foods. Split the other half of your plate between whole grains and lean protein.  Whole grains are high in fiber and rich in vitamins and nutrients. Good choices include whole-wheat bread, pasta, and brown rice.  Lean proteins give you nutrition with less fat. Good choices include fish, skinless chicken, and beans.  Low-fat or nonfat dairy provides nutrients without a lot of fat. Try low-fat or nonfat milk, cheese, or yogurt.  Healthy fats can be good for you in small amounts. These are unsaturated fats, such as olive oil, nuts, and fish. Try to have at least 2 servings per week of fatty fish, such as salmon, sardines, mackerel, rainbow trout, and albacore tuna. These contain omega-3 fatty acids, which are good for your heart. Flaxseed is another source of a heart-healthy fat.  More on heart-healthy eating  Read food labels  Healthy eating starts at the grocery store. Be sure to pay attention to food labels on packaged foods. Look for products that are high in fiber and protein, and low in saturated fat,  cholesterol, and sodium. Avoid products that contain trans fat. And pay close attention to serving size. For instance, if you plan to eat two servings, double all the numbers on the label.  Prepare food right  A key part of healthy cooking is cutting down on added fat and salt. Look on the internet for lower-fat, lower-sodium recipes. Also, try these tips:  Remove fat from meat and skin from poultry before cooking.  Skim fat from the surface of soups and sauces.  Broil, boil, bake, steam, grill, and microwave food without added fats.  Choose ingredients that spice up your food without adding calories, fat, or sodium. Try these items: horseradish, hot sauce, lemon, mustard, nonfat salad dressings, and vinegar. For salt-free herbs and spices, try basil, cilantro, cinnamon, pepper, and rosemary.  Date Last Reviewed: 10/1/2017  © 7167-8413 [employer]. [address] [place] [address]. All rights reserved. This information is not intended as a substitute for professional medical care. Always follow your healthcare professional's instructions.       What Is Osteoporosis?  Osteoporosis is a disease that weakens the bones. Weakened bones are more likely to break (fracture). Osteoporosis affects both men and women. But postmenopausal women are most at risk. To help prevent osteoporosis, you need to exercise and nourish your bones throughout your life.    Childhood  The body builds the most bone during these years. That's why boys and girls need foods rich in calcium. They also need plenty of exercise. A healthy diet and exercise helps bones grow strong.  Young adulthood to age 30  During young adulthood, bones become their strongest. This is called peak bone mass. The same good habits that kept bones healthy in childhood help keep bones healthy in adulthood.  Age 30 to menopause  Bone mass declines slightly during these years. Your body makes just enough new bone to maintain peak bone mass. To keep  your bones at their peak mass, be sure to exercise and get plenty of calcium.  After menopause  Menopause is when a woman stops having monthly periods. After menopause, the body makes less estrogen (female hormone). This increases bone loss. At this point, treatment may be needed to reduce the risk for fracture. Exercise and calcium can also help keep your bones strong.  Later in life  In later years, both men and women need to take extra care of their bones. By this point, the body loses more bone than it makes. If too much bone is lost, you may be at increased risk for fractures. With age, the quality and quantity of bone declines. You can lessen bone loss by staying active and increasing your calcium intake. Calcium supplements and other osteoporosis treatments do have risks. So talk with your healthcare provider if you have concerns. If you have osteoporosis, you can also learn ways to increase everyday safety.  Date Last Reviewed: 5/1/2018 © 2000-2018 [employer]. [address] [place] [address]. All rights reserved. This information is not intended as a substitute for professional medical care. Always follow your healthcare professional's instructions.          Preventing Osteoporosis: Meeting Your Calcium Needs    Your body needs calcium to build and repair bones. But it can't make calcium on its own. That's why it's important to eat calcium-rich foods. Some foods are naturally rich in calcium. Others have calcium added (fortified). It's best to get calcium from the foods you eat. But if you can't get enough, you may want to take calcium supplements. To meet your daily calcium needs, try the foods listed below.               Dairy Fish & beans Other sources   Source   Calcium (mg) per serving   Source   Calcium (mg) per serving   Source   Calcium (mg) per serving   Low-fat yogurt, plain   415 mg/8 oz.   Sardines, Atlantic, canned, with bones   351 mg/3 oz.   Oatmeal, instant,  fortified   215 mg/1 cup   Nonfat milk   302 mg/1 cup   [place], sockeye, canned, with bones   239 mg/3 oz.   Tofu made with calcium sulfate   204 mg/3 oz.   Low-fat milk   297 mg/1 cup   Soybeans, fresh, boiled   131 mg/1/2 cup   Collards   179 mg/1/2 cup   Swiss cheese   272 mg/1 oz.   White beans, cooked   81 mg/1/2 cup   English muffin, whole wheat   175 mg/1 muffin   Cheddar cheese   205 mg/1 oz.   Navy beans, cooked   79 mg/1/2 cup   Kale   90 mg/1/2 cup   Ice cream strawberry   79 mg/1/2 cup           Orange, navel   56 mg/1 medium   Note: Calcium levels may vary depending on brand and size.  Daily calcium needs  14 to 18 years old: 1,300 mg  19 to 30 years old: 1,000 mg  31 to 50 years old: 1,000 mg  51 to 70 years old, women: 1,200 mg  51 to 70 years old, men: 1,000 mg  Pregnant or nursin to 18 years old: 1,300 mg, 19 to 50 years old: 1,000 mg  Older than 70 (women and men): 1,200 mg   Date Last Reviewed: 2018-2018 [employer]. [address] [place] [address], [place] [address]. All rights reserved. This information is not intended as a substitute for professional medical care. Always follow your healthcare professional's instructions.          Vitamin D  Does this test have other names?  25-hydroxyvitamin D (25-high-DROX-ee-VIE-tuh-min D), 25(OH)D  What is this test?  Vitamin D is mainly found in fortified dairy foods, juice, breakfast cereal, and certain fish. This vitamin plays many roles in the body. But because it helps the body absorb calcium from foods and supplements, it's particularly important for bone health. Vitamin D has many additional roles in the body.  Vitamin D comes in several forms. When ultraviolet light, such as sunlight, hits your skin, it creates vitamin D3. D2 is used to fortify dairy foods. Both of these are further processed by your liver and kidneys into a form your body can use. Most tests for vitamin D check the level of a form circulating in the body  called 25-hydroxyvitamin D, also called 25(OH)D.   Why do I need this test?  You may need this test if your healthcare provider wants to check your vitamin D levels to find out if you have any risks to bone health. These might be:  Low calcium  Soft bones caused by low vitamin D or problems using it (osteomalacia)  Osteopenia  Osteoporosis  Rickets, in children  You may also need this test if you are at risk for low vitamin D levels. Risks include:  Being an older adult  Having difficulty absorbing fat from your diet  Having chronic kidney disease  Have dark skin pigmentation  Being a  baby  Vitamin D has many effects in the body. You may need this test to help your healthcare provider diagnose or treat:  Problems with the parathyroid gland  Cancer  Autoimmune diseases, such as multiple sclerosis and Crohn's disease  Psoriasis  Asthma  Weakness or falls    What other tests might I have along with this test?  A healthcare provider may also want to check your parathyroid hormone levels and your calcium levels.   What do my test results mean?  Test results may vary depending on your age, gender, health history, the method used for the test, and other things. Your test results may not mean you have a problem. Ask your healthcare provider what your test results mean for you.   Children and adults need more than 30 nanograms per milliliter (ng/ml) of vitamin D. The optimal level of 25(OH)D is usually between 30 and 60 ng/mL. Recommended daily amounts range from 400 to 800 international units (IU) per day based on your age.  Levels lower than normal can mean you are:  Not making enough vitamin D on your own  Not getting enough vitamin D in your diet  Not absorbing vitamin D from your food as you should  Lower levels may also mean that your body is not converting the vitamin as it should. This might be because of kidney or liver disease.  Above-normal levels may be a sign that you're taking too much in supplement  form.   How is this test done?  The test is done with a blood sample. A needle is used to draw blood from a vein in your arm or hand.   Does this test pose any risks?  Having a blood test with a needle carries some risks. These include bleeding, infection, bruising, and feeling lightheaded. When the needle pricks your arm or hand, you may feel a slight sting or pain. Afterward, the site may be sore.   What might affect my test results?  The amount of time you spend in the sunlight, your diet, and whether you take vitamin D in supplement form can affect your vitamin D levels. Ask your healthcare provider if any health conditions you have or medicines you take could affect your results.  How do I get ready for this test?  Tell your healthcare provider if you take vitamin D supplements. Be sure your healthcare provider knows about all medicines, herbs, vitamins, and supplements you are taking. This includes medicines that don't need a prescription and any illicit drugs you may use.   © 4499-1266 [employer]. [address] [place] [address]. All rights reserved. This information is not intended as a substitute for professional medical care. Always follow your healthcare professional's instructions.          Preventing Osteoporosis: Avoiding Bone Loss  Certain factors can speed up bone loss or decrease bone growth. For example, alcohol, cigarettes, and certain medicines reduce bone mass. Some foods make it hard for your body to absorb calcium.    Things to avoid  Here are things to avoid to help prevent osteoporosis:  Alcohol. This is toxic to bones. It is a major cause of bone loss. Heavy drinking can cause osteoporosis even if you have no other risk factors.  Smoking. This reduces bone mass. Smoking may also interfere with estrogen levels and cause early menopause.  Inactivity. Not being active makes your bones lose strength and become thinner. Over time, thin bones may break. Women who aren't  active are at a high risk for osteoporosis.  Certain medicines. Some medicines, such as cortisone, increase bone loss. They also decrease bone growth. Ask your healthcare provider about any side effects of your medicines, and how to prevent them.  Protein-rich or salty foods. Eaten in large amounts, these foods may deplete calcium.  Caffeine. This increases calcium loss. People who drink a lot of coffee, tea, or soda lose more calcium than those who don't.  Date Last Reviewed: 5/1/2018 © 2000-2018 [employer]. [address] [place] [address]. All rights reserved. This information is not intended as a substitute for professional medical care. Always follow your healthcare professional's instructions.          Living with Osteoporosis: Regular Exercise  If you have osteoporosis, exercise is vital for your health. It can prevent bone fractures and spine changes. It will slow bone loss. Exercise will strengthen your body. It can also be fun. A variety of exercises is best. See below for exercises that can help you. But before you start, talk with your healthcare provider to be sure these exercises are right for you.    Resistance exercises. These build muscle strength and maintain bone mass. They also make you less prone to injury. Exercises include lifting small weights, doing push-ups and sit-ups, using elastic exercise bands, and using weight machines.  Weight-bearing activities. These help your whole body. They also help you maintain bone mass. Activities include walking, dancing, and housework.  Non-weight-bearing exercises. These help prevent back strain and pain. They do this by building the trunk and leg muscles. Exercises that help with flexibility can prevent falls. Examples include swimming, water exercise, and stretching.  Staying safe  Here are tips to stay safe:   Always check with your healthcare provider before starting any new exercise program.  Use weights only as  instructed.  Stop any exercise that causes pain.   Date Last Reviewed: 5/1/2018  © 2161-2748 [employer]. [address] [facility], [place] [address]. All rights reserved. This information is not intended as a substitute for professional medical care. Always follow your healthcare professional's instructions.                  Perform labs fasting 8 hours with water or black coffee or or black tea diet  soda only prior to exam.    -Encourage healthy diet of whole food and avoid processed food and sugary drinks and sodas.  Diet should include lean meats and vegetables including 5-7 servings of fruit and vegetables total in 1 day.  Never skip breakfast.  -Encouraged exercise 30 minutes to 60 minutes 3-5 times weekly for 150minutes or more to prevent obesity and chronic disease and eliminate stress and its effect on the body.  -encouraged to continue not smoking or vaping  - recommend condom use per CDC recommendation for all  or unmarried couples  -mammogram order given if 40years old or older  - immunizations-annual flu shot recommended  -Vitamin D3  2000 units daily recommended- buy Over-the-counter  -Recommend 1000mg of calcium daily for osteoporosis prevention discussed. Need to ingest 1000mg of calcium daily to prevent osteoporosis later in life.  I.e. one 8 ounce glass of silk [place] milk has 450 mg of calcium and label states 45%.  Labels list calcium percentages not milligrams.  To calculate milligrams per serving remove the percentage and add a zero (0).  I.e. 9% calcium equals 90 mg  -thin prep pap recommended every 3 years-If previous pap was normal  sooner as directed by your doctor.

## (undated) NOTE — LETTER
Date & Time: 7/12/2021, 6:45 PM  Patient: Simin Blanton  Encounter Provider(s):    Eddie Jaime MD       To Whom It May Concern:    Yury Adam was seen and treated in our department on 7/12/2021. She can return to work 7/13/2021.     If you hav

## (undated) NOTE — MR AVS SNAPSHOT
[facility]  [address], [facility] [address]  [address] [phone]               Thank you for choosing us for your health care visit with [facility], DO.   We are glad to serve you and happy to provide you with this Enter your [employer] Activation Code exactly as it appears below along with your Zip Code and Date of Birth to complete the sign-up process. If you do not sign up before the expiration date, you must request a new code.     Your unique [employer] Access Code: [email or web address]

## (undated) NOTE — MR AVS SNAPSHOT
[facility]  [address], [facility] [address]  [address] [phone]               Thank you for choosing us for your health care visit with Harrison Coughlin MD.  We are glad to serve you and happy to provide you with this sum Your unique [employer] Access Code: [email or web address]  Expires: 8/4/2017 12:52 PM    If you have questions, you can call [phone] to talk to our [facility] Staff. Remember, [employer] is NOT to be used for urgent needs. For medical emergencies, dial 911.

## (undated) NOTE — IP AVS SNAPSHOT
[facility] [place] [address] [place], [address] ~ [phone]                Discharge Summary   6/18/2017    Sydney Muro           Admission Information        Provider Department    6/18/2017 Nick Chapman MD  [id number]      Why · Swelling in your hands, feet or face  · Sudden weight gain  · Shortness of breath  · \"Just not feeling right\"        Follow-up Information     Follow up with Massimo Reis MD. Schedule an appointment as soon as possible for a visit in 6 weeks.     Spec Postpartum Medications/Pain Management Resolved   Postpartum Self Care Resolved   Postpartum Psychosocial and Spiritual Support Resolved   Postpartum Discharge Resolved   Community Resources Resolved   Preeclampsia/Hypertension Resolved         Labor and D coverage. Patient [address] is a Federal Navigator program that can help with your Affordable Care Act coverage, as well as all types of Medicaid plans.   To get signed up and covered, please call [phone] and ask to get set up for an insuran

## (undated) NOTE — LETTER
[facility] [place] Department of [facility]N  After Care Instructions for Nexplanon       Bleeding    You may experience irregular bleeding for the first 3-6 months. If your bleeding becomes heavier than a normal menstrual cycle, please contact our office. Pain   You may experience mild pain to the arm typically lasting 24-48hrs. You may use Ibuprofen, Aleve and Tylenol to relieve the discomfort. If you experience severe or persistent pain contact our office. Restrictions    A bandage was placed on your arm to cover the site where the Nexplanon was inserted. Leave the larger bandage on for 12 hours and keep the smaller bandage clean, dry, and in place for 24-48 hours. When to contact our office   If you are experiencing discomfort described as worse than sore pain to your arm that's not relieved after taking Ibuprofen. Fever of 100.4 or greater with increase swelling or redness to your arm. Vaginal bleeding that is saturating 1 pad per hour. If you have additional questions or concerns, please call us at [phone].

## (undated) NOTE — LETTER
Keshawn Cordero, :8/10/1990    CONSENT FOR PROCEDURE/SEDATION    1. I authorize the performance upon Keshawn Cordero  the following: Nexplanon Insertion    2.  I authorize Dr. Edelmira Mar MD (and whomever is designated as the doctor’s assistant), t Witness: _________________________________________ Date:___________     Physician Signature: _______________________________ Date:___________

## (undated) NOTE — IP AVS SNAPSHOT
[facility] [place] [address] [place], [address] ~ [phone]                Discharge Summary   6/17/2017    Estrellita Horton           Admission Information        Provider Department    6/17/2017 Carmela Bear MD  [id number]      Why y up to 6 weeks following childbirth.   Contact your doctor or midwife immediately if you experience any of the following symptoms:  · Headache that does not go away  · Visual changes (seeing spots, blurred vision or partial vision loss)  · Feeling nauseated [address]. MyChart     Sign up for [employer], your secure online medical record. ClaimIt will allow you to access patient instructions from your recent visit,  view other health information, and more.  To sign up or find more information,

## (undated) NOTE — LETTER
Date: 2023      Patient Name: Izzy Lucero      : 8/10/1990        Thank you for choosing [facility] as your health care provider. Your physician has deemed the following medical service(s) necessary. However, your insurance plan may not pay for all of your health care and costs and may deny payment for this service. The fact that your insurance plan does not pay for an item or service does not mean you should not receive it. The purpose of this form is to help you make an informed decision about whether or not you want to receive this service(s) that may not be paid for by your insurance plan. CPT Code Description     Cost     _________ ______________________________  _____________      _________ ______________________________ _____________      _________ ______________________________ _____________      I understand that the above mentioned service(s) or supply may not be covered by my insurance company.  I agree to be financially responsible for the cost of this service or supply in the event of my insurance denies payment as a non-covered benefit.        ______________________________________________________________________  Signature of Patient or Patient's Representative  Relationship  Date    ______________________________________________________________________  Signature of Witness to signing of form   Printed Name